# Patient Record
Sex: FEMALE | Race: WHITE | Employment: FULL TIME | URBAN - METROPOLITAN AREA
[De-identification: names, ages, dates, MRNs, and addresses within clinical notes are randomized per-mention and may not be internally consistent; named-entity substitution may affect disease eponyms.]

---

## 2017-09-05 ENCOUNTER — APPOINTMENT (OUTPATIENT)
Dept: MRI IMAGING | Age: 62
DRG: 617 | End: 2017-09-05
Attending: NURSE PRACTITIONER
Payer: COMMERCIAL

## 2017-09-05 ENCOUNTER — APPOINTMENT (OUTPATIENT)
Dept: GENERAL RADIOLOGY | Age: 62
DRG: 617 | End: 2017-09-05
Attending: NURSE PRACTITIONER
Payer: COMMERCIAL

## 2017-09-05 ENCOUNTER — HOSPITAL ENCOUNTER (INPATIENT)
Age: 62
LOS: 7 days | Discharge: HOME | DRG: 617 | End: 2017-09-12
Attending: ORTHOPAEDIC SURGERY | Admitting: ORTHOPAEDIC SURGERY
Payer: COMMERCIAL

## 2017-09-05 ENCOUNTER — ANESTHESIA EVENT (OUTPATIENT)
Dept: SURGERY | Age: 62
DRG: 617 | End: 2017-09-05
Payer: COMMERCIAL

## 2017-09-05 PROBLEM — L02.612 FOOT ABSCESS, LEFT: Status: ACTIVE | Noted: 2017-09-05

## 2017-09-05 LAB
ALBUMIN SERPL-MCNC: 3.6 G/DL (ref 3.5–5)
ALBUMIN/GLOB SERPL: 0.9 {RATIO} (ref 1.1–2.2)
ALP SERPL-CCNC: 69 U/L (ref 45–117)
ALT SERPL-CCNC: 25 U/L (ref 12–78)
ANION GAP SERPL CALC-SCNC: 8 MMOL/L (ref 5–15)
APPEARANCE UR: CLEAR
AST SERPL-CCNC: 18 U/L (ref 15–37)
ATRIAL RATE: 92 BPM
BACTERIA URNS QL MICRO: NEGATIVE /HPF
BASOPHILS # BLD: 0 K/UL (ref 0–0.1)
BASOPHILS NFR BLD: 1 % (ref 0–1)
BILIRUB SERPL-MCNC: 0.7 MG/DL (ref 0.2–1)
BILIRUB UR QL: NEGATIVE
BUN SERPL-MCNC: 13 MG/DL (ref 6–20)
BUN/CREAT SERPL: 21 (ref 12–20)
CALCIUM SERPL-MCNC: 8.1 MG/DL (ref 8.5–10.1)
CALCULATED P AXIS, ECG09: 60 DEGREES
CALCULATED R AXIS, ECG10: 13 DEGREES
CALCULATED T AXIS, ECG11: 36 DEGREES
CHLORIDE SERPL-SCNC: 105 MMOL/L (ref 97–108)
CO2 SERPL-SCNC: 26 MMOL/L (ref 21–32)
COLOR UR: NORMAL
CREAT SERPL-MCNC: 0.62 MG/DL (ref 0.55–1.02)
DIAGNOSIS, 93000: NORMAL
EOSINOPHIL # BLD: 0.1 K/UL (ref 0–0.4)
EOSINOPHIL NFR BLD: 1 % (ref 0–7)
EPITH CASTS URNS QL MICRO: NORMAL /LPF
ERYTHROCYTE [DISTWIDTH] IN BLOOD BY AUTOMATED COUNT: 13.4 % (ref 11.5–14.5)
EST. AVERAGE GLUCOSE BLD GHB EST-MCNC: 131 MG/DL
GLOBULIN SER CALC-MCNC: 4.2 G/DL (ref 2–4)
GLUCOSE BLD STRIP.AUTO-MCNC: 129 MG/DL (ref 65–100)
GLUCOSE BLD STRIP.AUTO-MCNC: 146 MG/DL (ref 65–100)
GLUCOSE SERPL-MCNC: 79 MG/DL (ref 65–100)
GLUCOSE UR STRIP.AUTO-MCNC: NEGATIVE MG/DL
HBA1C MFR BLD: 6.2 % (ref 4.2–6.3)
HCT VFR BLD AUTO: 40.6 % (ref 35–47)
HGB BLD-MCNC: 13.4 G/DL (ref 11.5–16)
HGB UR QL STRIP: NEGATIVE
INR PPP: 1.1 (ref 0.9–1.1)
KETONES UR QL STRIP.AUTO: NEGATIVE MG/DL
LEUKOCYTE ESTERASE UR QL STRIP.AUTO: NEGATIVE
LYMPHOCYTES # BLD: 1.3 K/UL (ref 0.8–3.5)
LYMPHOCYTES NFR BLD: 24 % (ref 12–49)
MCH RBC QN AUTO: 29.1 PG (ref 26–34)
MCHC RBC AUTO-ENTMCNC: 33 G/DL (ref 30–36.5)
MCV RBC AUTO: 88.3 FL (ref 80–99)
MONOCYTES # BLD: 0.5 K/UL (ref 0–1)
MONOCYTES NFR BLD: 10 % (ref 5–13)
NEUTS SEG # BLD: 3.6 K/UL (ref 1.8–8)
NEUTS SEG NFR BLD: 64 % (ref 32–75)
NITRITE UR QL STRIP.AUTO: NEGATIVE
P-R INTERVAL, ECG05: 134 MS
PH UR STRIP: 6.5 [PH] (ref 5–8)
PLATELET # BLD AUTO: 255 K/UL (ref 150–400)
POTASSIUM SERPL-SCNC: 3.6 MMOL/L (ref 3.5–5.1)
PROT SERPL-MCNC: 7.8 G/DL (ref 6.4–8.2)
PROT UR STRIP-MCNC: NEGATIVE MG/DL
PROTHROMBIN TIME: 11.1 SEC (ref 9–11.1)
Q-T INTERVAL, ECG07: 364 MS
QRS DURATION, ECG06: 66 MS
QTC CALCULATION (BEZET), ECG08: 450 MS
RBC # BLD AUTO: 4.6 M/UL (ref 3.8–5.2)
RBC #/AREA URNS HPF: NORMAL /HPF (ref 0–5)
SERVICE CMNT-IMP: ABNORMAL
SERVICE CMNT-IMP: ABNORMAL
SODIUM SERPL-SCNC: 139 MMOL/L (ref 136–145)
SP GR UR REFRACTOMETRY: 1 (ref 1–1.03)
UA: UC IF INDICATED,UAUC: NORMAL
UROBILINOGEN UR QL STRIP.AUTO: 0.2 EU/DL (ref 0.2–1)
VENTRICULAR RATE, ECG03: 92 BPM
WBC # BLD AUTO: 5.5 K/UL (ref 3.6–11)
WBC URNS QL MICRO: NORMAL /HPF (ref 0–4)

## 2017-09-05 PROCEDURE — 77030032490 HC SLV COMPR SCD KNE COVD -B

## 2017-09-05 PROCEDURE — 85025 COMPLETE CBC W/AUTO DIFF WBC: CPT | Performed by: NURSE PRACTITIONER

## 2017-09-05 PROCEDURE — 65270000029 HC RM PRIVATE

## 2017-09-05 PROCEDURE — 81001 URINALYSIS AUTO W/SCOPE: CPT | Performed by: NURSE PRACTITIONER

## 2017-09-05 PROCEDURE — 80053 COMPREHEN METABOLIC PANEL: CPT | Performed by: NURSE PRACTITIONER

## 2017-09-05 PROCEDURE — 73720 MRI LWR EXTREMITY W/O&W/DYE: CPT

## 2017-09-05 PROCEDURE — 36415 COLL VENOUS BLD VENIPUNCTURE: CPT | Performed by: NURSE PRACTITIONER

## 2017-09-05 PROCEDURE — A9577 INJ MULTIHANCE: HCPCS | Performed by: ORTHOPAEDIC SURGERY

## 2017-09-05 PROCEDURE — 74011250637 HC RX REV CODE- 250/637: Performed by: NURSE PRACTITIONER

## 2017-09-05 PROCEDURE — 74011250636 HC RX REV CODE- 250/636: Performed by: NURSE PRACTITIONER

## 2017-09-05 PROCEDURE — 83036 HEMOGLOBIN GLYCOSYLATED A1C: CPT | Performed by: NURSE PRACTITIONER

## 2017-09-05 PROCEDURE — 86900 BLOOD TYPING SEROLOGIC ABO: CPT | Performed by: NURSE PRACTITIONER

## 2017-09-05 PROCEDURE — 85610 PROTHROMBIN TIME: CPT | Performed by: NURSE PRACTITIONER

## 2017-09-05 PROCEDURE — 93005 ELECTROCARDIOGRAM TRACING: CPT

## 2017-09-05 PROCEDURE — 82962 GLUCOSE BLOOD TEST: CPT

## 2017-09-05 PROCEDURE — 74011250636 HC RX REV CODE- 250/636: Performed by: ORTHOPAEDIC SURGERY

## 2017-09-05 RX ORDER — VANCOMYCIN 1.75 GRAM/500 ML IN 0.9 % SODIUM CHLORIDE INTRAVENOUS
1750 ONCE
Status: COMPLETED | OUTPATIENT
Start: 2017-09-05 | End: 2017-09-05

## 2017-09-05 RX ORDER — GABAPENTIN 100 MG/1
CAPSULE ORAL 3 TIMES DAILY
COMMUNITY

## 2017-09-05 RX ORDER — INSULIN LISPRO 100 [IU]/ML
INJECTION, SOLUTION INTRAVENOUS; SUBCUTANEOUS
Status: DISCONTINUED | OUTPATIENT
Start: 2017-09-05 | End: 2017-09-12 | Stop reason: HOSPADM

## 2017-09-05 RX ORDER — SODIUM CHLORIDE 9 MG/ML
75 INJECTION, SOLUTION INTRAVENOUS CONTINUOUS
Status: DISCONTINUED | OUTPATIENT
Start: 2017-09-05 | End: 2017-09-12 | Stop reason: HOSPADM

## 2017-09-05 RX ORDER — SODIUM CHLORIDE 0.9 % (FLUSH) 0.9 %
5-10 SYRINGE (ML) INJECTION EVERY 8 HOURS
Status: DISCONTINUED | OUTPATIENT
Start: 2017-09-05 | End: 2017-09-12 | Stop reason: HOSPADM

## 2017-09-05 RX ORDER — ACETAMINOPHEN 325 MG/1
650 TABLET ORAL EVERY 6 HOURS
Status: DISCONTINUED | OUTPATIENT
Start: 2017-09-05 | End: 2017-09-12 | Stop reason: HOSPADM

## 2017-09-05 RX ORDER — CYCLOBENZAPRINE HCL 5 MG
7.5 TABLET ORAL
COMMUNITY

## 2017-09-05 RX ORDER — CYCLOBENZAPRINE HCL 10 MG
7.5 TABLET ORAL
Status: DISCONTINUED | OUTPATIENT
Start: 2017-09-05 | End: 2017-09-12 | Stop reason: HOSPADM

## 2017-09-05 RX ORDER — ONDANSETRON 2 MG/ML
4 INJECTION INTRAMUSCULAR; INTRAVENOUS
Status: DISCONTINUED | OUTPATIENT
Start: 2017-09-05 | End: 2017-09-12 | Stop reason: HOSPADM

## 2017-09-05 RX ORDER — PRAVASTATIN SODIUM 10 MG/1
20 TABLET ORAL
Status: DISCONTINUED | OUTPATIENT
Start: 2017-09-05 | End: 2017-09-12 | Stop reason: HOSPADM

## 2017-09-05 RX ORDER — AMOXICILLIN 250 MG
2 CAPSULE ORAL 2 TIMES DAILY
Status: DISCONTINUED | OUTPATIENT
Start: 2017-09-05 | End: 2017-09-12 | Stop reason: HOSPADM

## 2017-09-05 RX ORDER — OXYCODONE HYDROCHLORIDE 5 MG/1
5 TABLET ORAL
Status: DISCONTINUED | OUTPATIENT
Start: 2017-09-05 | End: 2017-09-12 | Stop reason: HOSPADM

## 2017-09-05 RX ORDER — NALOXONE HYDROCHLORIDE 0.4 MG/ML
0.4 INJECTION, SOLUTION INTRAMUSCULAR; INTRAVENOUS; SUBCUTANEOUS AS NEEDED
Status: DISCONTINUED | OUTPATIENT
Start: 2017-09-05 | End: 2017-09-12 | Stop reason: HOSPADM

## 2017-09-05 RX ORDER — SODIUM CHLORIDE 0.9 % (FLUSH) 0.9 %
5-10 SYRINGE (ML) INJECTION EVERY 8 HOURS
Status: DISCONTINUED | OUTPATIENT
Start: 2017-09-05 | End: 2017-09-08 | Stop reason: SDUPTHER

## 2017-09-05 RX ORDER — SODIUM CHLORIDE 0.9 % (FLUSH) 0.9 %
5-10 SYRINGE (ML) INJECTION AS NEEDED
Status: DISCONTINUED | OUTPATIENT
Start: 2017-09-05 | End: 2017-09-12 | Stop reason: HOSPADM

## 2017-09-05 RX ORDER — MAGNESIUM SULFATE 100 %
4 CRYSTALS MISCELLANEOUS AS NEEDED
Status: DISCONTINUED | OUTPATIENT
Start: 2017-09-05 | End: 2017-09-12 | Stop reason: HOSPADM

## 2017-09-05 RX ORDER — OXYCODONE HYDROCHLORIDE 5 MG/1
10 TABLET ORAL
Status: DISCONTINUED | OUTPATIENT
Start: 2017-09-05 | End: 2017-09-12 | Stop reason: HOSPADM

## 2017-09-05 RX ORDER — SODIUM CHLORIDE 0.9 % (FLUSH) 0.9 %
5-10 SYRINGE (ML) INJECTION AS NEEDED
Status: DISCONTINUED | OUTPATIENT
Start: 2017-09-05 | End: 2017-09-08 | Stop reason: SDUPTHER

## 2017-09-05 RX ORDER — GABAPENTIN 100 MG/1
100 CAPSULE ORAL 3 TIMES DAILY
Status: DISCONTINUED | OUTPATIENT
Start: 2017-09-05 | End: 2017-09-12 | Stop reason: HOSPADM

## 2017-09-05 RX ADMIN — OXYCODONE HYDROCHLORIDE 5 MG: 5 TABLET ORAL at 12:43

## 2017-09-05 RX ADMIN — SODIUM CHLORIDE 75 ML/HR: 900 INJECTION, SOLUTION INTRAVENOUS at 12:34

## 2017-09-05 RX ADMIN — SENNOSIDES AND DOCUSATE SODIUM 2 TABLET: 8.6; 5 TABLET ORAL at 13:42

## 2017-09-05 RX ADMIN — VANCOMYCIN HYDROCHLORIDE 1750 MG: 10 INJECTION, POWDER, LYOPHILIZED, FOR SOLUTION INTRAVENOUS at 16:03

## 2017-09-05 RX ADMIN — Medication 10 ML: at 22:30

## 2017-09-05 RX ADMIN — GADOBENATE DIMEGLUMINE 13 ML: 529 INJECTION, SOLUTION INTRAVENOUS at 14:35

## 2017-09-05 RX ADMIN — Medication 10 ML: at 13:43

## 2017-09-05 RX ADMIN — PRAVASTATIN SODIUM 20 MG: 10 TABLET ORAL at 22:29

## 2017-09-05 RX ADMIN — OXYCODONE HYDROCHLORIDE 10 MG: 5 TABLET ORAL at 18:06

## 2017-09-05 RX ADMIN — GABAPENTIN 100 MG: 100 CAPSULE ORAL at 22:29

## 2017-09-05 RX ADMIN — ACETAMINOPHEN 650 MG: 325 TABLET ORAL at 18:06

## 2017-09-05 RX ADMIN — OXYCODONE HYDROCHLORIDE 5 MG: 5 TABLET ORAL at 22:29

## 2017-09-05 RX ADMIN — ACETAMINOPHEN 650 MG: 325 TABLET ORAL at 13:42

## 2017-09-05 RX ADMIN — SODIUM CHLORIDE 75 ML/HR: 900 INJECTION, SOLUTION INTRAVENOUS at 16:00

## 2017-09-05 RX ADMIN — GABAPENTIN 100 MG: 100 CAPSULE ORAL at 18:07

## 2017-09-05 NOTE — PROGRESS NOTES
Pharmacy Automatic Renal Dosing Protocol - Antimicrobials    Indication:  SSTI, Left foot wound    Current Regimens:      Vancomycin consult -   Abx Allergies:  PCN, Sulfa    Micro: none presently noted (none noted PTA)    Ht Readings from Last 1 Encounters:   17 154.9 cm (61\")   ,  Wt Readings from Last 1 Encounters:   17 65.8 kg (145 lb 1 oz)        Estimated Creatinine Clearance: 80.6 mL/min (based on Cr of 0.62). Estimated Creatinine Clearance (using IBW): 70.1 mL/min    Recent Labs      17   1225   CREA  0.62   BUN  13   WBC  5.5   K  3.6   CA  8.1*   ALB  3.6   HGB  13.4   HCT  40.6   PLT  255   INR  1.1   ALT  25     Temp (24hrs), Av °F (36.7 °C), Min:98 °F (36.7 °C), Max:98 °F (36.7 °C)    CAPD, PD, HD or CVVH:  none  Paralysis, amputations:  none  Vancomycin Trough Goal Level:  10-15    Impression/Plan:  Vancomycin 1750mg IV loading dose then 1000mg IV q12h for a projected trough at Css of 14.7. No osteo identified in diagnostics or mentioned thusfar. Ordered BMP and CBC daily x 3 days. Will need trough level ordered before 3rd maintenance dose. Pharmacy will follow daily and adjust doses of monitored medications as appropriate.   Thank you,  Jovana Davis, [facility]

## 2017-09-05 NOTE — PERIOP NOTES
TRANSFER - IN REPORT:    Verbal report received from Leni Knapp on Bartolo Baird  being received from ortho (unit) for ordered procedure      Report consisted of patients Situation, Background, Assessment and   Recommendations(SBAR). Information from the following report(s) SBAR and Recent Results was reviewed with the receiving nurse. Opportunity for questions and clarification was provided. Assessment completed upon patients arrival to unit and care assumed. Surgery scheduled for 9/6/17; pt is new to [place]. Will call tomorrow am for an updated report.

## 2017-09-05 NOTE — PROGRESS NOTES
Primary Nurse Maximo Marcos and Jie Gill, RUDY performed a dual skin assessment on this patient No impairment noted  Javon score is 21    Wound on left foot, wrapped, clean and intact

## 2017-09-05 NOTE — H&P
Subjective:      Chief Complaint: Pain of the Left Foot        HPI:  Erik Munroe is a 60 y.o. female   With history of left foot pain for approximately 1 week. She has had a long-standing history of bilateral foot deformity. This included bilateral bunions and hammertoes. She has had it looked at before by an unknown provider and a recommended no surgical treatment as the deformity was not causing her any pain. She now reported that she had a sudden onset of pain 1 week ago. There was no injury associated with this. The pain is now constant. She describes it as a burning pain is located in the forefoot diffusely. She also has sharp pain present. The pain is 7/10 currently. It was 10/10 at its worst.  She has denied fevers. She believes that she did have some chills yesterday, these have since resolved. Putting pressure on her foot makes it worse. Icing the foot makes it better. She is a nonsmoker. She cannot report any recent cuts or injuries to the foot. She denies being diabetic.           Objective   Objective:   Constitutional:  No acute distress. Well nourished. Well developed. HEENT: atraumatic, normocephalic  Respiratory:  No labored breathing  Cardiovascular:  NRRR  Psychiatric: Alert and oriented x3.       Left LE:  Alignment:  Severe hallux valgus deformity with severe rigid hammertoe deformity. The 2nd MTP joint is dislocated and non reducible. There is a dorsal callus at the 2nd PIP joint. There is a significant plantar callus underneath the 2nd metatarsal head. Significant fullness of soft tissue at the plantar forefoot. The swelling appears to track distally into the 2nd toe.     SILT DP/SP/MP/LP/Sural/Saphenous  +EHL/FHL/TA/GSC/PL/PB  Palpable DP  CR brisk         Medical History         Past Medical History:   Diagnosis Date    Osteoporosis               Surgical History    History reviewed.  No pertinent surgical history.             Allergies   Allergen Reactions    Penicillins      Sulfa Antibiotics Rash          Social History    Social History            Social History    Marital status:        Spouse name: N/A    Number of children: N/A    Years of education: N/A          Occupational History    Not on file.           Social History Main Topics    Smoking status: Former Smoker    Smokeless tobacco: Never Used    Alcohol use Yes    Drug use: No    Sexual activity: Not on file           Other Topics Concern    Not on file      Social History Narrative               Radiographs:        X-ray Foot Left 3+ Views (20381)     Result Date: 9/5/2017  Standing. AP, Oblique, Lat. Notes  Indication(s):   Left foot Impression:  three views of the left foot with weight-bearing demonstrates severe hallux valgus deformity and hammertoe deformity of the  Lesser toes. There is complete dislocation of the 2nd MTP joint. No fractures are noted.            Assessment   Assessment:      1. Left foot pain    2. Abscess of foot                Plan   Plan:   In the office, I pared down the 2nd metatarsal head callus on the plantar surface of the foot. After unroofing this, abundant purulent material came from the underlying ulceration. This was cultured and is being sent to the lab. I am admitting the patient directly to the hospital.  She will undergo an MRI to evaluate for extent of the abscess. She is not currently septic appearing she will undergo IV antibiotics in the meantime. I will then perform a incision and debridement of the foot abscess. She also likely require a 2nd toe amputation given the involvement clinically. Because the 2nd toe is dislocated and driving the 2nd metatarsal  Head down  Into the ground, it is unlikely that the patient's symptoms and ulcerative lesion underneath the 2nd metatarsal head would improve with out amputating the 2nd toe. Functionally, this toe is not helping the patient whatsoever.   Additionally, acute correction of the deformity is not possible giving her severity, chronicity of deformity and the underlying infection. In the future, she may require a 2nd metatarsal shortening osteotomy or metatarsal head resection if her plantar callus does not resolve despite amputation of the 2nd toe. She may also require fusion of the great toe MTP joint once she has cleared her infection completely.   I will also order hemoglobin A1c while she is in the hospital.

## 2017-09-05 NOTE — PROGRESS NOTES
Problem: Falls - Risk of  Goal: *Absence of Falls  Document Dwight Fall Risk and appropriate interventions in the flowsheet.    Outcome: Progressing Towards Goal  Fall Risk Interventions:              Medication Interventions: Patient to call before getting OOB, Teach patient to arise slowly

## 2017-09-05 NOTE — IP AVS SNAPSHOT
[address] [facility] 
[phone] Patient: Aman Dee MRN: [id number] You are allergic to the following Allergen Reactions Penicillin G Rash Has been able to take amoxicillin in past   
    
 Sulfa (Sulfonamide Antibiotics) Rash Recent Documentation Height Weight BMI OB Status Smoking Status 1.549 m 65.8 kg 27.41 kg/m2 Hysterectomy Former Smoker Unresulted Labs Order Current Status CULTURE, BLOOD Preliminary result CULTURE, BLOOD Preliminary result Emergency Contacts Name Discharge Info Relation Home Work Mobile [facility] [facility] DISCHARGE CAREGIVER [3] Sister [23] [phone] About your hospitalization You were admitted on:  2017 You last received care in the:  [facility] You were discharged on:  2017 Unit phone number:  [phone] Why you were hospitalized Your primary diagnosis was: Foot Abscess, Left Providers Seen During Your Hospitalizations Provider Role Specialty Primary office phone Neftali Alejandra MD Attending Provider Orthopedic Surgery [phone] Your Primary Care Physician (PCP) Primary Care Physician Office Phone Office Fax Brooke Barillas 89 [phone] Follow-up Information Follow up With Details Comments Contact Info [facility] On 2017 This is your rolling walker provider. [address] [place] [address] 
[phone] Luis Antonio Simon MD   [address], [address] [place] [id number] [facility] 
[phone] Neftali Alejandra MD In 1 week  [facility] [address] [facility] 
[phone] Current Discharge Medication List  
  
START taking these medications Dose & Instructions Dispensing Information Comments Morning Noon Evening Bedtime  
 acetaminophen 325 mg tablet Commonly known as:  TYLENOL Your last dose was: Your next dose is:    
   
   
 Dose:  650 mg Take 2 Tabs by mouth every six (6) hours as needed for Pain. For mild to moderate pain Refills:  0  
     
   
   
   
  
 oxyCODONE IR 5 mg immediate release tablet Commonly known as:  Major Alexsandra Your last dose was: Your next dose is:    
   
   
 Dose:  5 mg Take 1 Tab by mouth every four (4) hours as needed (For pain 7-10). Max Daily Amount: 30 mg.  
 Quantity:  40 Tab Refills:  0  
     
   
   
   
  
 polyethylene glycol 17 gram packet Commonly known as:  Ada Royalty Your last dose was: Your next dose is:    
   
   
 Dose:  17 g Take 1 Packet by mouth daily as needed (constipation) for up to 15 days. Quantity:  15 Packet Refills:  0  
     
   
   
   
  
 senna-docusate 8.6-50 mg per tablet Commonly known as:  Alba Roller Your last dose was: Your next dose is:    
   
   
 Dose:  1 Tab Take 1 Tab by mouth daily. Quantity:  30 Tab Refills:  0 CONTINUE these medications which have NOT CHANGED Dose & Instructions Dispensing Information Comments Morning Noon Evening Bedtime  
 alendronate 70 mg tablet Commonly known as:  FOSAMAX Your last dose was: Your next dose is:    
   
   
 Dose:  70 mg Take 70 mg by mouth every Sunday. Refills:  0  
     
   
   
   
  
 b complex vitamins tablet Your last dose was: Your next dose is:    
   
   
 Dose:  1 Tab Take 1 Tab by mouth daily. Refills:  0 CRANBERRY CONCENTRATE Cap Generic drug:  vitamin c-vitamin e Your last dose was: Your next dose is: Take  by mouth. Refills:  0  
     
   
   
   
  
 cyclobenzaprine 5 mg tablet Commonly known as:  FLEXERIL Your last dose was: Your next dose is:    
   
   
 Dose:  7.5 mg Take 7.5 mg by mouth three (3) times daily as needed for Muscle Spasm(s). Refills:  0  
     
   
   
   
  
 gabapentin 100 mg capsule Commonly known as:  NEURONTIN Your last dose was: Your next dose is: Take  by mouth three (3) times daily. Refills:  0  
     
   
   
   
  
 lovastatin 20 mg tablet Commonly known as:  MEVACOR Your last dose was: Your next dose is: TAKE 1 TABLET NIGHTLY FOR CHOLESTEROL Quantity:  90 Tab Refills:  3 MULTI-VITAMIN PO Your last dose was: Your next dose is: Take  by mouth. Refills:  0  
     
   
   
   
  
 OTHER Your last dose was: Your next dose is:    
   
   
 Histanex:  May take 5 ml by mouth up to three times per day as needed for cough Quantity:  120 mL Refills:  0  
     
   
   
   
  
 VITAMIN B-12 1,000 mcg tablet Generic drug:  cyanocobalamin Your last dose was: Your next dose is:    
   
   
 Dose:  1000 mcg Take 1,000 mcg by mouth daily. Refills:  0  
     
   
   
   
  
 VITAMIN D3 1,000 unit tablet Generic drug:  cholecalciferol Your last dose was: Your next dose is: Take  by mouth daily. Refills:  0 STOP taking these medications   
 meloxicam 15 mg tablet Commonly known as:  MOBIC Where to Get Your Medications Information on where to get these meds will be given to you by the nurse or doctor. ! Ask your nurse or doctor about these medications  
  oxyCODONE IR 5 mg immediate release tablet  
 polyethylene glycol 17 gram packet  
 senna-docusate 8.6-50 mg per tablet Discharge Instructions Shmuel Minor MD 
Postoperative Instructions Right/Left Lower Extremity: 
  Heel touch weightbearing Dressing: Keep Dressing or Splint clean & dry Change dressing daily Showering: ? You may shower 48 hours after your surgery. Do Not allow dressing or splint to get wet! Diet:  
? Advance diet as tolerated. You may experience nausea due to anesthesia, pain or pain medications. You should avoid spicy or heavy meals initially. Pain Management: 
? If you have received a block, the pain relief can last from 4-24 hours. This also means you may not have sensation or movement in your foot for the same amount of time. ? You will have pain after the block wears off. Anticipate this and start pain medications prior to the block wearing off. 
? Do Not drive while taking narcotic pain medications. Elevation: 
? Strict elevation at or just above the level of your heart for the first 14 days after surgery. Limit time that foot is in dependent position for trips to bathroom and kitchen as much as possible. Early control of swelling will improve future swelling. Ice:  
? You may ice your surgical extremity for no longer than 20 minutes at a time, 3-4 times per day. Do Not apply ice directly to the skin. _ 
 
? Please DO NOT take any NSAIDs (Ibuprofen, Advil, Motrin, or Aleve) Call our office at [phone] if the following occur: ? Severe swelling, profuse bleeding or severe pain which does not improve with pain medication. ? Fever greater than 101.0; fevers less than this are very common in the first few days after surgery and are unlikely to indicate infection. Follow up: next week with Dr. Debby Lombardo. Call [phone] to schedule an appointment Discharge Orders None [facility] Announcement We are excited to announce that we are making your provider's discharge notes available to you in [employer]. You will see these notes when they are completed and signed by the physician that discharged you from your recent hospital stay.   If you have any questions or concerns about any information you see in [employer], please call the Health Information Department where you were seen or reach out to your Primary Care Provider for more information about your plan of care. Introducing [facility]! [facility] introduces [employer]pass patient portal. Now you can access parts of your medical record, email your doctor's office, and request medication refills online. 1. In your internet browser, go to [email or web address] 2. Click on the First Time User? Click Here link in the Sign In box. You will see the New Member Sign Up page. 3. Enter your Privepass Access Code exactly as it appears below. You will not need to use this code after youve completed the sign-up process. If you do not sign up before the expiration date, you must request a new code. · Privepass Access Code: [email or web address] Expires: 12/4/2017  9:38 AM 
 
4. Enter the last four digits of your Social Security Number (xxxx) and Date of Birth (mm/dd/yyyy) as indicated and click Submit. You will be taken to the next sign-up page. 5. Create a Privepass ID. This will be your Privepass login ID and cannot be changed, so think of one that is secure and easy to remember. 6. Create a Privepass password. You can change your password at any time. 7. Enter your Password Reset Question and Answer. This can be used at a later time if you forget your password. 8. Enter your e-mail address. You will receive e-mail notification when new information is available in [address] 9. Click Sign Up. You can now view and download portions of your medical record. 10. Click the Download Summary menu link to download a portable copy of your medical information. If you have questions, please visit the Frequently Asked Questions section of the [employer]pass website. Remember, [employer]pass is NOT to be used for urgent needs. For medical emergencies, dial 911. Now available from your iPhone and Android! General Information Please provide this summary of care documentation to your next provider. Patient Signature:  ____________________________________________________________ Date:  ____________________________________________________________  
  
Dewane Salen Provider Signature:  ____________________________________________________________ Date:  ____________________________________________________________

## 2017-09-06 ENCOUNTER — APPOINTMENT (OUTPATIENT)
Dept: GENERAL RADIOLOGY | Age: 62
DRG: 617 | End: 2017-09-06
Attending: ORTHOPAEDIC SURGERY
Payer: COMMERCIAL

## 2017-09-06 ENCOUNTER — APPOINTMENT (OUTPATIENT)
Dept: GENERAL RADIOLOGY | Age: 62
DRG: 617 | End: 2017-09-06
Attending: NURSE PRACTITIONER
Payer: COMMERCIAL

## 2017-09-06 ENCOUNTER — ANESTHESIA (OUTPATIENT)
Dept: SURGERY | Age: 62
DRG: 617 | End: 2017-09-06
Payer: COMMERCIAL

## 2017-09-06 LAB
ABO + RH BLD: NORMAL
ANION GAP SERPL CALC-SCNC: 7 MMOL/L (ref 5–15)
BASOPHILS # BLD: 0 K/UL (ref 0–0.1)
BASOPHILS NFR BLD: 0 % (ref 0–1)
BLOOD GROUP ANTIBODIES SERPL: NORMAL
BUN SERPL-MCNC: 14 MG/DL (ref 6–20)
BUN/CREAT SERPL: 23 (ref 12–20)
CALCIUM SERPL-MCNC: 8 MG/DL (ref 8.5–10.1)
CHLORIDE SERPL-SCNC: 107 MMOL/L (ref 97–108)
CO2 SERPL-SCNC: 26 MMOL/L (ref 21–32)
CREAT SERPL-MCNC: 0.62 MG/DL (ref 0.55–1.02)
EOSINOPHIL # BLD: 0.2 K/UL (ref 0–0.4)
EOSINOPHIL NFR BLD: 4 % (ref 0–7)
ERYTHROCYTE [DISTWIDTH] IN BLOOD BY AUTOMATED COUNT: 13.5 % (ref 11.5–14.5)
GLUCOSE BLD STRIP.AUTO-MCNC: 106 MG/DL (ref 65–100)
GLUCOSE BLD STRIP.AUTO-MCNC: 117 MG/DL (ref 65–100)
GLUCOSE BLD STRIP.AUTO-MCNC: 82 MG/DL (ref 65–100)
GLUCOSE BLD STRIP.AUTO-MCNC: 89 MG/DL (ref 65–100)
GLUCOSE BLD STRIP.AUTO-MCNC: 95 MG/DL (ref 65–100)
GLUCOSE SERPL-MCNC: 98 MG/DL (ref 65–100)
HCT VFR BLD AUTO: 34.3 % (ref 35–47)
HGB BLD-MCNC: 11.2 G/DL (ref 11.5–16)
LYMPHOCYTES # BLD: 1.3 K/UL (ref 0.8–3.5)
LYMPHOCYTES NFR BLD: 27 % (ref 12–49)
MCH RBC QN AUTO: 29.2 PG (ref 26–34)
MCHC RBC AUTO-ENTMCNC: 32.7 G/DL (ref 30–36.5)
MCV RBC AUTO: 89.6 FL (ref 80–99)
MONOCYTES # BLD: 0.7 K/UL (ref 0–1)
MONOCYTES NFR BLD: 15 % (ref 5–13)
NEUTS SEG # BLD: 2.6 K/UL (ref 1.8–8)
NEUTS SEG NFR BLD: 54 % (ref 32–75)
PLATELET # BLD AUTO: 216 K/UL (ref 150–400)
POTASSIUM SERPL-SCNC: 3.9 MMOL/L (ref 3.5–5.1)
RBC # BLD AUTO: 3.83 M/UL (ref 3.8–5.2)
SERVICE CMNT-IMP: ABNORMAL
SERVICE CMNT-IMP: ABNORMAL
SERVICE CMNT-IMP: NORMAL
SODIUM SERPL-SCNC: 140 MMOL/L (ref 136–145)
SPECIMEN EXP DATE BLD: NORMAL
WBC # BLD AUTO: 4.8 K/UL (ref 3.6–11)

## 2017-09-06 PROCEDURE — 71010 XR CHEST SNGL V: CPT

## 2017-09-06 PROCEDURE — 76000 FLUOROSCOPY <1 HR PHYS/QHP: CPT

## 2017-09-06 PROCEDURE — 85025 COMPLETE CBC W/AUTO DIFF WBC: CPT | Performed by: NURSE PRACTITIONER

## 2017-09-06 PROCEDURE — 0Y6S0Z0 DETACHMENT AT LEFT 2ND TOE, COMPLETE, OPEN APPROACH: ICD-10-PCS | Performed by: ORTHOPAEDIC SURGERY

## 2017-09-06 PROCEDURE — 87186 SC STD MICRODIL/AGAR DIL: CPT | Performed by: ORTHOPAEDIC SURGERY

## 2017-09-06 PROCEDURE — 74011250636 HC RX REV CODE- 250/636: Performed by: NURSE PRACTITIONER

## 2017-09-06 PROCEDURE — 76210000040 HC AMBSU PH I REC FIRST 0.5 HR: Performed by: ORTHOPAEDIC SURGERY

## 2017-09-06 PROCEDURE — 77030003601 HC NDL NRV BLK BBMI -A: Performed by: ANESTHESIOLOGY

## 2017-09-06 PROCEDURE — 77030020143 HC AIRWY LARYN INTUB CGAS -A: Performed by: ANESTHESIOLOGY

## 2017-09-06 PROCEDURE — 74011250636 HC RX REV CODE- 250/636

## 2017-09-06 PROCEDURE — 87077 CULTURE AEROBIC IDENTIFY: CPT | Performed by: ORTHOPAEDIC SURGERY

## 2017-09-06 PROCEDURE — 74011250636 HC RX REV CODE- 250/636: Performed by: ANESTHESIOLOGY

## 2017-09-06 PROCEDURE — 0KBW0ZZ EXCISION OF LEFT FOOT MUSCLE, OPEN APPROACH: ICD-10-PCS | Performed by: ORTHOPAEDIC SURGERY

## 2017-09-06 PROCEDURE — 77030006788 HC BLD SAW OSC STRY -B: Performed by: ORTHOPAEDIC SURGERY

## 2017-09-06 PROCEDURE — 65270000029 HC RM PRIVATE

## 2017-09-06 PROCEDURE — 87205 SMEAR GRAM STAIN: CPT | Performed by: ORTHOPAEDIC SURGERY

## 2017-09-06 PROCEDURE — 74011250637 HC RX REV CODE- 250/637: Performed by: NURSE PRACTITIONER

## 2017-09-06 PROCEDURE — 77030002916 HC SUT ETHLN J&J -A: Performed by: ORTHOPAEDIC SURGERY

## 2017-09-06 PROCEDURE — 36415 COLL VENOUS BLD VENIPUNCTURE: CPT | Performed by: NURSE PRACTITIONER

## 2017-09-06 PROCEDURE — 73620 X-RAY EXAM OF FOOT: CPT

## 2017-09-06 PROCEDURE — 77030011640 HC PAD GRND REM COVD -A: Performed by: ORTHOPAEDIC SURGERY

## 2017-09-06 PROCEDURE — 76210000050 HC AMBSU PH II REC 0.5 TO 1 HR: Performed by: ORTHOPAEDIC SURGERY

## 2017-09-06 PROCEDURE — 0QBP0ZZ EXCISION OF LEFT METATARSAL, OPEN APPROACH: ICD-10-PCS | Performed by: ORTHOPAEDIC SURGERY

## 2017-09-06 PROCEDURE — 82962 GLUCOSE BLOOD TEST: CPT

## 2017-09-06 PROCEDURE — 74011000250 HC RX REV CODE- 250

## 2017-09-06 PROCEDURE — 74011250636 HC RX REV CODE- 250/636: Performed by: ORTHOPAEDIC SURGERY

## 2017-09-06 PROCEDURE — 87075 CULTR BACTERIA EXCEPT BLOOD: CPT | Performed by: ORTHOPAEDIC SURGERY

## 2017-09-06 PROCEDURE — 76060000062 HC AMB SURG ANES 1 TO 1.5 HR: Performed by: ORTHOPAEDIC SURGERY

## 2017-09-06 PROCEDURE — 64450 NJX AA&/STRD OTHER PN/BRANCH: CPT | Performed by: ANESTHESIOLOGY

## 2017-09-06 PROCEDURE — 80048 BASIC METABOLIC PNL TOTAL CA: CPT | Performed by: NURSE PRACTITIONER

## 2017-09-06 PROCEDURE — 76030000001 HC AMB SURG OR TIME 1 TO 1.5: Performed by: ORTHOPAEDIC SURGERY

## 2017-09-06 RX ORDER — MORPHINE SULFATE 10 MG/ML
2 INJECTION, SOLUTION INTRAMUSCULAR; INTRAVENOUS
Status: DISCONTINUED | OUTPATIENT
Start: 2017-09-06 | End: 2017-09-06 | Stop reason: HOSPADM

## 2017-09-06 RX ORDER — FENTANYL CITRATE 50 UG/ML
INJECTION, SOLUTION INTRAMUSCULAR; INTRAVENOUS
Status: DISPENSED
Start: 2017-09-06 | End: 2017-09-07

## 2017-09-06 RX ORDER — ROPIVACAINE HYDROCHLORIDE 5 MG/ML
INJECTION, SOLUTION EPIDURAL; INFILTRATION; PERINEURAL
Status: DISPENSED
Start: 2017-09-06 | End: 2017-09-07

## 2017-09-06 RX ORDER — DIPHENHYDRAMINE HYDROCHLORIDE 50 MG/ML
12.5 INJECTION, SOLUTION INTRAMUSCULAR; INTRAVENOUS AS NEEDED
Status: DISCONTINUED | OUTPATIENT
Start: 2017-09-06 | End: 2017-09-06 | Stop reason: HOSPADM

## 2017-09-06 RX ORDER — SODIUM CHLORIDE 0.9 % (FLUSH) 0.9 %
5-10 SYRINGE (ML) INJECTION AS NEEDED
Status: DISCONTINUED | OUTPATIENT
Start: 2017-09-06 | End: 2017-09-06 | Stop reason: HOSPADM

## 2017-09-06 RX ORDER — FENTANYL CITRATE 50 UG/ML
INJECTION, SOLUTION INTRAMUSCULAR; INTRAVENOUS AS NEEDED
Status: DISCONTINUED | OUTPATIENT
Start: 2017-09-06 | End: 2017-09-06 | Stop reason: HOSPADM

## 2017-09-06 RX ORDER — HYDROMORPHONE HYDROCHLORIDE 1 MG/ML
.2-.5 INJECTION, SOLUTION INTRAMUSCULAR; INTRAVENOUS; SUBCUTANEOUS ONCE
Status: DISCONTINUED | OUTPATIENT
Start: 2017-09-06 | End: 2017-09-06 | Stop reason: HOSPADM

## 2017-09-06 RX ORDER — SODIUM CHLORIDE, SODIUM LACTATE, POTASSIUM CHLORIDE, CALCIUM CHLORIDE 600; 310; 30; 20 MG/100ML; MG/100ML; MG/100ML; MG/100ML
25 INJECTION, SOLUTION INTRAVENOUS CONTINUOUS
Status: DISCONTINUED | OUTPATIENT
Start: 2017-09-06 | End: 2017-09-06 | Stop reason: HOSPADM

## 2017-09-06 RX ORDER — PHENYLEPHRINE HCL IN 0.9% NACL 0.4MG/10ML
SYRINGE (ML) INTRAVENOUS AS NEEDED
Status: DISCONTINUED | OUTPATIENT
Start: 2017-09-06 | End: 2017-09-06 | Stop reason: HOSPADM

## 2017-09-06 RX ORDER — FENTANYL CITRATE 50 UG/ML
25 INJECTION, SOLUTION INTRAMUSCULAR; INTRAVENOUS
Status: DISCONTINUED | OUTPATIENT
Start: 2017-09-06 | End: 2017-09-06 | Stop reason: HOSPADM

## 2017-09-06 RX ORDER — LIDOCAINE HYDROCHLORIDE 20 MG/ML
INJECTION, SOLUTION INFILTRATION; PERINEURAL
Status: DISPENSED
Start: 2017-09-06 | End: 2017-09-07

## 2017-09-06 RX ORDER — LIDOCAINE HYDROCHLORIDE 20 MG/ML
INJECTION, SOLUTION EPIDURAL; INFILTRATION; INTRACAUDAL; PERINEURAL AS NEEDED
Status: DISCONTINUED | OUTPATIENT
Start: 2017-09-06 | End: 2017-09-06 | Stop reason: HOSPADM

## 2017-09-06 RX ORDER — MIDAZOLAM HYDROCHLORIDE 1 MG/ML
INJECTION, SOLUTION INTRAMUSCULAR; INTRAVENOUS AS NEEDED
Status: DISCONTINUED | OUTPATIENT
Start: 2017-09-06 | End: 2017-09-06 | Stop reason: HOSPADM

## 2017-09-06 RX ORDER — ONDANSETRON 2 MG/ML
INJECTION INTRAMUSCULAR; INTRAVENOUS AS NEEDED
Status: DISCONTINUED | OUTPATIENT
Start: 2017-09-06 | End: 2017-09-06 | Stop reason: HOSPADM

## 2017-09-06 RX ORDER — SODIUM CHLORIDE 0.9 % (FLUSH) 0.9 %
5-10 SYRINGE (ML) INJECTION EVERY 8 HOURS
Status: DISCONTINUED | OUTPATIENT
Start: 2017-09-06 | End: 2017-09-06 | Stop reason: HOSPADM

## 2017-09-06 RX ORDER — ENOXAPARIN SODIUM 100 MG/ML
40 INJECTION SUBCUTANEOUS EVERY 24 HOURS
Status: DISCONTINUED | OUTPATIENT
Start: 2017-09-06 | End: 2017-09-12 | Stop reason: HOSPADM

## 2017-09-06 RX ORDER — MIDAZOLAM HYDROCHLORIDE 1 MG/ML
INJECTION, SOLUTION INTRAMUSCULAR; INTRAVENOUS
Status: DISPENSED
Start: 2017-09-06 | End: 2017-09-07

## 2017-09-06 RX ORDER — PROPOFOL 10 MG/ML
INJECTION, EMULSION INTRAVENOUS AS NEEDED
Status: DISCONTINUED | OUTPATIENT
Start: 2017-09-06 | End: 2017-09-06 | Stop reason: HOSPADM

## 2017-09-06 RX ORDER — OXYCODONE AND ACETAMINOPHEN 5; 325 MG/1; MG/1
1 TABLET ORAL ONCE
Status: DISCONTINUED | OUTPATIENT
Start: 2017-09-06 | End: 2017-09-06 | Stop reason: HOSPADM

## 2017-09-06 RX ORDER — LIDOCAINE HYDROCHLORIDE 10 MG/ML
0.1 INJECTION, SOLUTION EPIDURAL; INFILTRATION; INTRACAUDAL; PERINEURAL AS NEEDED
Status: DISCONTINUED | OUTPATIENT
Start: 2017-09-06 | End: 2017-09-06 | Stop reason: HOSPADM

## 2017-09-06 RX ADMIN — ENOXAPARIN SODIUM 40 MG: 40 INJECTION SUBCUTANEOUS at 20:30

## 2017-09-06 RX ADMIN — Medication 10 ML: at 23:25

## 2017-09-06 RX ADMIN — SENNOSIDES AND DOCUSATE SODIUM 2 TABLET: 8.6; 5 TABLET ORAL at 17:37

## 2017-09-06 RX ADMIN — Medication 80 MCG: at 15:01

## 2017-09-06 RX ADMIN — Medication 40 MCG: at 14:37

## 2017-09-06 RX ADMIN — MIDAZOLAM HYDROCHLORIDE 0.5 MG: 1 INJECTION, SOLUTION INTRAMUSCULAR; INTRAVENOUS at 14:11

## 2017-09-06 RX ADMIN — LIDOCAINE HYDROCHLORIDE 20 MG: 20 INJECTION, SOLUTION EPIDURAL; INFILTRATION; INTRACAUDAL; PERINEURAL at 14:12

## 2017-09-06 RX ADMIN — SODIUM CHLORIDE 75 ML/HR: 900 INJECTION, SOLUTION INTRAVENOUS at 16:27

## 2017-09-06 RX ADMIN — GABAPENTIN 100 MG: 100 CAPSULE ORAL at 16:40

## 2017-09-06 RX ADMIN — OXYCODONE HYDROCHLORIDE 10 MG: 5 TABLET ORAL at 23:24

## 2017-09-06 RX ADMIN — OXYCODONE HYDROCHLORIDE 10 MG: 5 TABLET ORAL at 20:30

## 2017-09-06 RX ADMIN — SODIUM CHLORIDE 75 ML/HR: 900 INJECTION, SOLUTION INTRAVENOUS at 09:55

## 2017-09-06 RX ADMIN — VANCOMYCIN HYDROCHLORIDE 1000 MG: 1 INJECTION, POWDER, LYOPHILIZED, FOR SOLUTION INTRAVENOUS at 16:38

## 2017-09-06 RX ADMIN — ONDANSETRON 4 MG: 2 INJECTION INTRAMUSCULAR; INTRAVENOUS at 14:53

## 2017-09-06 RX ADMIN — Medication 40 MCG: at 14:29

## 2017-09-06 RX ADMIN — Medication 10 ML: at 16:08

## 2017-09-06 RX ADMIN — ACETAMINOPHEN 650 MG: 325 TABLET ORAL at 08:09

## 2017-09-06 RX ADMIN — PRAVASTATIN SODIUM 20 MG: 10 TABLET ORAL at 23:24

## 2017-09-06 RX ADMIN — OXYCODONE HYDROCHLORIDE 10 MG: 5 TABLET ORAL at 08:09

## 2017-09-06 RX ADMIN — FENTANYL CITRATE 50 MCG: 50 INJECTION, SOLUTION INTRAMUSCULAR; INTRAVENOUS at 12:58

## 2017-09-06 RX ADMIN — GABAPENTIN 100 MG: 100 CAPSULE ORAL at 08:09

## 2017-09-06 RX ADMIN — PROPOFOL 150 MG: 10 INJECTION, EMULSION INTRAVENOUS at 14:12

## 2017-09-06 RX ADMIN — ACETAMINOPHEN 650 MG: 325 TABLET ORAL at 01:00

## 2017-09-06 RX ADMIN — ACETAMINOPHEN 650 MG: 325 TABLET ORAL at 20:30

## 2017-09-06 RX ADMIN — VANCOMYCIN HYDROCHLORIDE 1000 MG: 1 INJECTION, POWDER, LYOPHILIZED, FOR SOLUTION INTRAVENOUS at 04:05

## 2017-09-06 RX ADMIN — GABAPENTIN 100 MG: 100 CAPSULE ORAL at 23:24

## 2017-09-06 RX ADMIN — SODIUM CHLORIDE, SODIUM LACTATE, POTASSIUM CHLORIDE, AND CALCIUM CHLORIDE 25 ML/HR: 600; 310; 30; 20 INJECTION, SOLUTION INTRAVENOUS at 12:28

## 2017-09-06 RX ADMIN — Medication 40 MCG: at 14:23

## 2017-09-06 RX ADMIN — MIDAZOLAM HYDROCHLORIDE 2 MG: 1 INJECTION, SOLUTION INTRAMUSCULAR; INTRAVENOUS at 12:58

## 2017-09-06 NOTE — ANESTHESIA PREPROCEDURE EVALUATION
Anesthetic History     PONV (denies motion sickness)          Review of Systems / Medical History  Patient summary reviewed, nursing notes reviewed and pertinent labs reviewed    Pulmonary  Within defined limits                 Neuro/Psych   Within defined limits           Cardiovascular              Hyperlipidemia    Exercise tolerance: >4 METS     GI/Hepatic/Renal             Pertinent negatives: No GERD   Endo/Other    Diabetes (borderline)    Arthritis     Other Findings   Comments: Low back pain/ HNP L4-5  Sciatica right hip           Physical Exam    Airway  Mallampati: II  TM Distance: 4 - 6 cm  Neck ROM: normal range of motion   Mouth opening: Normal     Cardiovascular    Rhythm: regular  Rate: normal      Pertinent negatives: No murmur   Dental  No notable dental hx       Pulmonary  Breath sounds clear to auscultation               Abdominal  GI exam deferred       Other Findings            Anesthetic Plan    ASA: 2  Anesthesia type: regional and general - popliteal fossa block and saphenous block      Post-op pain plan if not by surgeon: peripheral nerve block single    Induction: Intravenous  Anesthetic plan and risks discussed with: Patient      preop glucose 95. PONV prophylaxis.

## 2017-09-06 NOTE — ROUTINE PROCESS
Bedside and Verbal shift change report given to [facility] (oncoming nurse) by Gaetano Herrera (offgoing nurse). Report included the following information SBAR, Intake/Output, MAR and Recent Results.

## 2017-09-06 NOTE — PERIOP NOTES
Cale Humphrey  [birth year]  [id number]    Situation:  Verbal report given from: LUIS Golden CRNA, RN  Procedure: Procedure(s):  INCISION AND DEBRIDEMENT OF LEFT FOOT ABSCESS, AMPUTATION OF LEFT SECOND TOE, EXCISION OF LEFT SECOND METATARSAL HEAD  (GENERAL WITH BLOCK)    Background:    Preoperative diagnosis: ABSCESS OF LEFT FOOT    Postoperative diagnosis: ABSCESS OF LEFT FOOT, HAMMERTOE, PLANTAR KERATOSIS          :  Dr. Gianna Lr    Assistant(s): Circ-1: Lj Aguilar RN  Scrub Tech-1: [place] Alcantar  Surg Asst-1: Catie Morin    Specimens:   ID Type Source Tests Collected by Time Destination   1 : Left second toe Preservative Toe  Neftali Lr MD 9/6/2017 1430 Pathology   1 : Left foot wound Wound Foot, left ANAEROBIC/AEROBIC/GRAM STAIN Neftali Lr MD 9/6/2017 1438 Microbiology       Assessment:  Intra-procedure medications         Anesthesia gave intra-procedure sedation and medications, see anesthesia flow sheet     Intravenous fluids: LR@ KVO     Vital signs stable       Recommendation:    Permission to share finding with family or friend yes

## 2017-09-06 NOTE — ANESTHESIA PROCEDURE NOTES
Peripheral Block    Start time: 9/6/2017 12:13 PM  End time: 9/6/2017 1:18 PM  Performed by: Medardo Melgar  Authorized by: Medardo Melgar       Pre-procedure:   Timeout Time: 12:57          Block Type:   Block Type:  Saphenous  Laterality:  Left  Monitoring:  Standard ASA monitoring, responsive to questions and oxygen  Injection Technique:  Single shot  Procedures: ultrasound guided    Patient Position: prone  Prep: chlorhexidine    Location:  Medial anterior leg above ankle (level of tibial tubercle)  Catheter size: 25 g.   Medication Injected:  0.5%  ropivacaine  Volume (mL):  10    Assessment:  Number of attempts:  1  Injection Assessment:  Incremental injection every 5 mL, no paresthesia, local visualized surrounding nerve on ultrasound, negative aspiration for blood and no intravascular symptoms  Patient tolerance:  Patient tolerated the procedure well with no immediate complications

## 2017-09-06 NOTE — INTERVAL H&P NOTE
H&P Update:  Jade Naidu was seen and examined. History and physical has been reviewed. The patient has been examined. There have been no significant clinical changes since the completion of the originally dated History and Physical.  Patient identified by surgeon; surgical site was confirmed by patient and surgeon. MRI reviewed showing sinus tract from plantar foot to 2nd metatarsal head. Changes consistent with osteomyelitis of 2nd metatarsal head as well. Will plan on amputating 2nd toe, debriding abscess and infected/necrotic material, and excising the 2nd metatarsal head for curative purpose and to relieve increased plantar pressure which caused the plantar keratosis. Consent obtained after risks/benefits discussed. Risks included but not limited to anesthetic complications, bleeding, wound healing delay, nerve injury/pain, worsening infection, need for more proximal amputation. She understands and wishes to proceed. Signed By: Josr Cunningham MD     September 6, 2017 1:55 PM

## 2017-09-06 NOTE — BRIEF OP NOTE
BRIEF OPERATIVE NOTE    Date of Procedure: 9/6/2017   Preoperative Diagnosis: ABSCESS OF LEFT FOOT  Postoperative Diagnosis: ABSCESS OF LEFT FOOT, HAMMERTOE, PLANTAR KERATOSIS          Procedure(s):  INCISION AND DEBRIDEMENT OF LEFT FOOT ABSCESS, AMPUTATION OF LEFT SECOND TOE, EXCISION OF LEFT SECOND METATARSAL HEAD  (GENERAL WITH BLOCK)  Surgeon(s) and Role:     * Neftali Chavez MD - Primary         Assistant Staff:       Surgical Staff:  Circ-1: Óscar Smith RN  Scrub Tech-1: Kalpana Carrillo  Surg Asst-1: Bert Quintana  Event Time In   Incision Start 1427   Incision Close 1502     Anesthesia: General   Estimated Blood Loss: < 10 cc  Specimens:   ID Type Source Tests Collected by Time Destination   1 : Left second toe Preservative Toe  Neftali Chavez MD 9/6/2017 1430 Pathology   1 : Left foot wound Wound Foot, left ANAEROBIC/AEROBIC/GRAM STAIN Neftali Chavez MD 9/6/2017 1438 Microbiology      Findings: see full note   Complications: none  Implants: * No implants in log *

## 2017-09-06 NOTE — ANESTHESIA PROCEDURE NOTES
Peripheral Block    Start time: 9/6/2017 12:55 PM  End time: 9/6/2017 1:15 PM  Performed by: Fely Corey  Authorized by: Fely Corey       Pre-procedure:    Indications: at surgeon's request and post-op pain management    Preanesthetic Checklist: patient identified, risks and benefits discussed, site marked, timeout performed, anesthesia consent given and patient being monitored    Timeout Time: 12:57          Block Type:   Block Type:  Popliteal  Laterality:  Left  Monitoring:  Standard ASA monitoring, responsive to questions and oxygen  Injection Technique:  Single shot  Procedures: ultrasound guided and nerve stimulator    Patient Position: prone  Prep: chlorhexidine    Location:  Mid thigh  Needle Type:  Stimuplex  Needle Gauge:  22 G  Needle Localization:  Ultrasound guidance and nerve stimulator  Motor Response: minimal motor response >0.4 mA    Medication Injected:  0.5%  ropivacaine  Volume (mL):  40    Assessment:  Number of attempts:  1  Injection Assessment:  Incremental injection every 5 mL, no paresthesia, ultrasound image on chart, local visualized surrounding nerve on ultrasound, negative aspiration for blood and no intravascular symptoms  Patient tolerance:  Patient tolerated the procedure well with no immediate complications

## 2017-09-06 NOTE — PERIOP NOTES
Dr. Anu Giordano performed popliteal fossa block and saphenous block in preop using ultrasound machine and nerve stimulator. Pt on CM x3, 02 by NC at 2L, patient responsive when spoken to. Able to answer questions appropriately. Pt did receive 2mg Versed and 50mcg fentanyl given by Dr. Anu Giordano for sedation. Pt tolerated procedure well.  VSS and will continue to monitor

## 2017-09-06 NOTE — PERIOP NOTES
TRANSFER - IN REPORT:    Verbal report received from Jamia Bassett RN on Mary Vazquez  being received from Room [id number] for ordered procedure      Report consisted of patients Situation, Background, Assessment and   Recommendations(SBAR). Information from the following report(s) SBAR was reviewed with the receiving nurse. Opportunity for questions and clarification was provided.

## 2017-09-06 NOTE — PERIOP NOTES
TRANSFER - OUT REPORT:    Verbal report given to Hermes Massey RN(name) on Kathy Holden  being transferred to Ortho(unit) for routine post - op       Report consisted of patients Situation, Background, Assessment and   Recommendations(SBAR). Information from the following report(s) SBAR, Kardex, OR Summary, Intake/Output and MAR was reviewed with the receiving nurse. Opportunity for questions and clarification was provided.       Patient transported with:   Registered Nurse

## 2017-09-06 NOTE — PERIOP NOTES
1521-Pt very sleepy, checked blood sugar by fingerstick, result is 82.    1528-pt tolerating liquids at this time. VSS. Pt has block in L foot/ leg. Will fit pt for a new post op shoe. 1600-Pt voided large amount of yellow urine in bedpan. Saline lock in LAC flushed and capped    1625-Pt transported to room 3217, assisted onto bed and L leg elevated and SCD machine connected. [facility] in room assuming care of the patient.

## 2017-09-06 NOTE — OP NOTES
DATE: 9/6/17    Preoperative Diagnosis:  Left Lower extremity:  1. Abscess forefoot  2. 2nd hammertoe with complete dislocation of MTP joint  3. Hypertrophic plantar keratosis    Postoperative Diagnosis:  Same    Procedure:  1. Amputation 2 toe, through MTP joint  2. Exostectomy of   3. Debridement of plantar foot abscess    Surgeon:   oJno Dowell MD    Anesthesia:  General  Ankle block  1% plain lidocaine local injection    EBL:  10 cc    Complications:  none    Specimen:  2nd toe    Implants:  none     HPI/Indication:  59 yo female with history of severe forefoot deformity causing an ulceration under her 2nd metatarsal head which became infected. An abscess of the forefoot with a sinus tract to the 2nd metatarsal head subsequently developed. Amputation recommended as digit is not salvageable and maintenance of current state may result in worsening infection, sepsis, worsening pain and deformity. Furthermore, the abscess and any infected/necrotic tissure requires resection. This includes exostectomy of the 2nd metatarsal for osteomyelitis. Risks/benefits discussed with patient, which include but are not limited to bleeding, nerve pain, further toe deformity, wound healing delay, worsening infection, need for further surgery. Procedure: The patient arrived at the surgical center. Consent was obtained for the surgical procedure, as well as the Anesthetic. The correct surgical limb was identified and marked by the patient and myself. The Anesthesiologist placed a nerve block. The patient was then brought to the Operating suite, where a time-out was performed which identified the patient's identity, the surgical site, and the surgical procedure. The patient was prepped and draped in standard, sterile fashion. Once completely draped, another time-out was performed identifying all pertinent information.     The leg was elevated for 2 minutes and a tourniquet was inflated at the level of the thigh at 250 mmHg.    The 2nd toe was identified. A raquette type incision was performed and soft tissue structures were sharply incised, exposing the 2nd metatarsal head. The toe was amputated through the MTP joint. The MT head was then identified. There was a significant deformity with a prominent plantar aspect to the metatarsal head. A TPS saw was then used to excise the distal portion of the metatarsal on an oblique angle to avoid increased plantar pressure. Attention was then turned to the plantar ulceration. Anaerobic and aerobic swabs were performed and sent for culture. Hypertrophic skin was debrided with sharp cutting and rongeuring. Debridement deeper was then carried out in and excisional fashion by sharp cutting for any necrotic, contaminated and infected appearing tissue. This included skin, subcutaneous tissue, musle, fat. 3 L of sterile saline was pulse lavaged through the wound. The tourniquet was removed and hemostasis was achieved. The ulcer was inspected and debrided until healthy appearing tissues with bleeding edges were obtained. In total, the area was debrided to 15mm by 10mm. The wounds were irrigated and closed in a layered fashion. A sterile dressing of xeroform, 4x4s and sterile webril and an ACE was applied.     Plan:  - Weightbearing: as tolerated through heel in Postop shoe  - Keep dressing clean and dry  - Elevate extremity just above level of heart  - Pain control  - ID consult  - follow cultures  - IV vancomycin for now  - Dispo: pending ID input and antibiotics; likely DC on oral antibiotics

## 2017-09-06 NOTE — ANESTHESIA POSTPROCEDURE EVALUATION
Post-Anesthesia Evaluation and Assessment    Patient: Cale Humphrey MRN: [id number]  SSN: [id number]    YOB: 1955  Age: 60 y.o. Sex: female       Cardiovascular Function/Vital Signs  Visit Vitals    /73    Pulse 98    Temp 36.9 °C (98.4 °F)    Resp 15    Ht 5' 1\" (1.549 m)    Wt 65.8 kg (145 lb 1 oz)    SpO2 95%    BMI 27.41 kg/m2       Patient is status post regional, general anesthesia for Procedure(s):  INCISION AND DEBRIDEMENT OF LEFT FOOT ABSCESS, AMPUTATION OF LEFT SECOND TOE, EXCISION OF LEFT SECOND METATARSAL HEAD  (GENERAL WITH BLOCK). Nausea/Vomiting: None    Postoperative hydration reviewed and adequate. Pain:  Pain Scale 1: Numeric (0 - 10) (09/06/17 1541)  Pain Intensity 1: 0 (09/06/17 1541)   Managed. Pt has Left popliteal-saphenous blocks. Neurological Status:   Neuro (WDL): Within Defined Limits (09/06/17 1526)  Neuro  Neurologic State: Alert (09/06/17 1526)   At baseline    Mental Status and Level of Consciousness: Arousable    Pulmonary Status:   O2 Device: Room air (09/06/17 1541)   Adequate oxygenation and airway patent    Complications related to anesthesia: None    Post-anesthesia assessment completed. No concerns. Stable for return to floor; inpatient.     Signed By: Noris Piña MD     September 6, 2017

## 2017-09-06 NOTE — ROUTINE PROCESS
Bedside and Verbal shift change report given to [facility] (oncoming nurse) by Ramon Stratton (offgoing nurse). Report included the following information SBAR, Kardex, Intake/Output, MAR and Recent Results.

## 2017-09-06 NOTE — PROGRESS NOTES
S: patient comfortable. Pain controlled. No cp, sob. No chills.     O:  Visit Vitals    /66 (BP 1 Location: Right arm, BP Patient Position: At rest)    Pulse 76    Temp 98 °F (36.7 °C)    Resp 11    Ht 5' 1\" (1.549 m)    Wt 65.8 kg (145 lb 1 oz)    SpO2 97%    BMI 27.41 kg/m2       Left Lower Extremity:   - Dressing with purulent drainage from plantar foot  - Motor: +EHL/FHL/TA/GSC/PL/PB  - SILT DP/MP/LP/MP/SURAL/SAPHENOUS  - Vascular: + DP, CR < 2 sec    Pertinent Labs:  Hgb A1C - 6.2      A/P: left foot abscess, plantar keratosis 2/2 hammertoe deformity and increased plantar pressure under the 2nd metatarsal head  - plan on OR today  - continue vancomycin pending cultures  - pain control

## 2017-09-07 LAB
ANION GAP SERPL CALC-SCNC: 5 MMOL/L (ref 5–15)
BASOPHILS # BLD: 0 K/UL (ref 0–0.1)
BASOPHILS NFR BLD: 0 % (ref 0–1)
BUN SERPL-MCNC: 12 MG/DL (ref 6–20)
BUN/CREAT SERPL: 15 (ref 12–20)
CALCIUM SERPL-MCNC: 8.3 MG/DL (ref 8.5–10.1)
CHLORIDE SERPL-SCNC: 102 MMOL/L (ref 97–108)
CO2 SERPL-SCNC: 30 MMOL/L (ref 21–32)
CREAT SERPL-MCNC: 0.79 MG/DL (ref 0.55–1.02)
DATE LAST DOSE: ABNORMAL
EOSINOPHIL # BLD: 0.1 K/UL (ref 0–0.4)
EOSINOPHIL NFR BLD: 2 % (ref 0–7)
ERYTHROCYTE [DISTWIDTH] IN BLOOD BY AUTOMATED COUNT: 13.3 % (ref 11.5–14.5)
GLUCOSE BLD STRIP.AUTO-MCNC: 110 MG/DL (ref 65–100)
GLUCOSE BLD STRIP.AUTO-MCNC: 118 MG/DL (ref 65–100)
GLUCOSE BLD STRIP.AUTO-MCNC: 125 MG/DL (ref 65–100)
GLUCOSE BLD STRIP.AUTO-MCNC: 153 MG/DL (ref 65–100)
GLUCOSE SERPL-MCNC: 92 MG/DL (ref 65–100)
HCT VFR BLD AUTO: 36.4 % (ref 35–47)
HGB BLD-MCNC: 12.2 G/DL (ref 11.5–16)
LYMPHOCYTES # BLD: 1.8 K/UL (ref 0.8–3.5)
LYMPHOCYTES NFR BLD: 28 % (ref 12–49)
MCH RBC QN AUTO: 30.4 PG (ref 26–34)
MCHC RBC AUTO-ENTMCNC: 33.5 G/DL (ref 30–36.5)
MCV RBC AUTO: 90.8 FL (ref 80–99)
MONOCYTES # BLD: 0.7 K/UL (ref 0–1)
MONOCYTES NFR BLD: 10 % (ref 5–13)
NEUTS SEG # BLD: 4 K/UL (ref 1.8–8)
NEUTS SEG NFR BLD: 60 % (ref 32–75)
PLATELET # BLD AUTO: 272 K/UL (ref 150–400)
POTASSIUM SERPL-SCNC: 4.1 MMOL/L (ref 3.5–5.1)
RBC # BLD AUTO: 4.01 M/UL (ref 3.8–5.2)
REPORTED DOSE,DOSE: ABNORMAL UNITS
REPORTED DOSE/TIME,TMG: 1600
SERVICE CMNT-IMP: ABNORMAL
SODIUM SERPL-SCNC: 137 MMOL/L (ref 136–145)
VANCOMYCIN TROUGH SERPL-MCNC: 14.6 UG/ML (ref 5–10)
WBC # BLD AUTO: 6.6 K/UL (ref 3.6–11)

## 2017-09-07 PROCEDURE — 80048 BASIC METABOLIC PNL TOTAL CA: CPT | Performed by: ORTHOPAEDIC SURGERY

## 2017-09-07 PROCEDURE — 80202 ASSAY OF VANCOMYCIN: CPT | Performed by: ORTHOPAEDIC SURGERY

## 2017-09-07 PROCEDURE — 74011250637 HC RX REV CODE- 250/637: Performed by: NURSE PRACTITIONER

## 2017-09-07 PROCEDURE — 65270000029 HC RM PRIVATE

## 2017-09-07 PROCEDURE — 88311 DECALCIFY TISSUE: CPT | Performed by: ORTHOPAEDIC SURGERY

## 2017-09-07 PROCEDURE — 85025 COMPLETE CBC W/AUTO DIFF WBC: CPT | Performed by: ORTHOPAEDIC SURGERY

## 2017-09-07 PROCEDURE — 97116 GAIT TRAINING THERAPY: CPT | Performed by: PHYSICAL THERAPIST

## 2017-09-07 PROCEDURE — G8980 MOBILITY D/C STATUS: HCPCS | Performed by: PHYSICAL THERAPIST

## 2017-09-07 PROCEDURE — 97161 PT EVAL LOW COMPLEX 20 MIN: CPT | Performed by: PHYSICAL THERAPIST

## 2017-09-07 PROCEDURE — 36415 COLL VENOUS BLD VENIPUNCTURE: CPT | Performed by: ORTHOPAEDIC SURGERY

## 2017-09-07 PROCEDURE — 88305 TISSUE EXAM BY PATHOLOGIST: CPT | Performed by: ORTHOPAEDIC SURGERY

## 2017-09-07 PROCEDURE — 74011250636 HC RX REV CODE- 250/636: Performed by: ORTHOPAEDIC SURGERY

## 2017-09-07 PROCEDURE — G8979 MOBILITY GOAL STATUS: HCPCS | Performed by: PHYSICAL THERAPIST

## 2017-09-07 PROCEDURE — 74011250636 HC RX REV CODE- 250/636: Performed by: NURSE PRACTITIONER

## 2017-09-07 PROCEDURE — 82962 GLUCOSE BLOOD TEST: CPT

## 2017-09-07 PROCEDURE — 87040 BLOOD CULTURE FOR BACTERIA: CPT | Performed by: NURSE PRACTITIONER

## 2017-09-07 PROCEDURE — G8978 MOBILITY CURRENT STATUS: HCPCS | Performed by: PHYSICAL THERAPIST

## 2017-09-07 PROCEDURE — 97530 THERAPEUTIC ACTIVITIES: CPT | Performed by: PHYSICAL THERAPIST

## 2017-09-07 RX ADMIN — OXYCODONE HYDROCHLORIDE 10 MG: 5 TABLET ORAL at 22:13

## 2017-09-07 RX ADMIN — OXYCODONE HYDROCHLORIDE 10 MG: 5 TABLET ORAL at 18:51

## 2017-09-07 RX ADMIN — ENOXAPARIN SODIUM 40 MG: 40 INJECTION SUBCUTANEOUS at 22:13

## 2017-09-07 RX ADMIN — ACETAMINOPHEN 650 MG: 325 TABLET ORAL at 13:42

## 2017-09-07 RX ADMIN — ACETAMINOPHEN 650 MG: 325 TABLET ORAL at 18:51

## 2017-09-07 RX ADMIN — GABAPENTIN 100 MG: 100 CAPSULE ORAL at 09:00

## 2017-09-07 RX ADMIN — GABAPENTIN 100 MG: 100 CAPSULE ORAL at 15:24

## 2017-09-07 RX ADMIN — VANCOMYCIN HYDROCHLORIDE 1000 MG: 1 INJECTION, POWDER, LYOPHILIZED, FOR SOLUTION INTRAVENOUS at 03:38

## 2017-09-07 RX ADMIN — OXYCODONE HYDROCHLORIDE 10 MG: 5 TABLET ORAL at 03:37

## 2017-09-07 RX ADMIN — VANCOMYCIN HYDROCHLORIDE 1000 MG: 1 INJECTION, POWDER, LYOPHILIZED, FOR SOLUTION INTRAVENOUS at 15:23

## 2017-09-07 RX ADMIN — Medication 10 ML: at 06:00

## 2017-09-07 RX ADMIN — Medication 10 ML: at 14:24

## 2017-09-07 RX ADMIN — ACETAMINOPHEN 650 MG: 325 TABLET ORAL at 06:39

## 2017-09-07 RX ADMIN — SENNOSIDES AND DOCUSATE SODIUM 2 TABLET: 8.6; 5 TABLET ORAL at 09:00

## 2017-09-07 RX ADMIN — OXYCODONE HYDROCHLORIDE 10 MG: 5 TABLET ORAL at 06:36

## 2017-09-07 RX ADMIN — Medication 10 ML: at 22:16

## 2017-09-07 RX ADMIN — Medication 10 ML: at 22:15

## 2017-09-07 RX ADMIN — ACETAMINOPHEN 650 MG: 325 TABLET ORAL at 03:37

## 2017-09-07 RX ADMIN — PRAVASTATIN SODIUM 20 MG: 10 TABLET ORAL at 22:13

## 2017-09-07 RX ADMIN — GABAPENTIN 100 MG: 100 CAPSULE ORAL at 22:13

## 2017-09-07 RX ADMIN — OXYCODONE HYDROCHLORIDE 10 MG: 5 TABLET ORAL at 13:42

## 2017-09-07 RX ADMIN — SENNOSIDES AND DOCUSATE SODIUM 2 TABLET: 8.6; 5 TABLET ORAL at 18:51

## 2017-09-07 NOTE — CONSULTS
Infectious Disease Consult Note    Reason for Consult: osteomyelitis   Date of Consultation: 2017  Date of Admission: 2017  Referring Physician: Dr Debby Lombardo       HPI:  Robin Abraham is a 60y.o. year old female with history of ? DM as patient says it is borderline and A1C was 5.6 in  , meka/melvina  who was in her general state of health when she noticed pain in her left foot about a week or so ago. Denied any trauma to feet. She also reported that she had chills but denied fevers. Denied any open wounds on her foot or discharge. Ortho notes indicate that she had \"  pared down the 2nd metatarsal head callus on the plantar surface of the foot.  After unroofing this, abundant purulent material came from the underlying ulceration.  This was cultured . \" She was admitted to the hospital on  and underwent amputation 2 toe, through MTP joint, and debridement of plantar foot abscess. Cultures are pending at this time but gram stain reported as g+C pairs and light probable S aureus, coag negative. MRI Left foot showed  \"fluid filled tract extending from the volar forefoot to the head of the second metatarsal likely related to infection\". \"Abnormal signal in the distal half of the second metatarsal likely related to osteomyelitis. : \"Mild second MTP joint effusion which could be reactive versus related to septic arthritis. . She reports that she has good foot wear usually, wears tennis shoes. Denied any other issues at this time. ID consulted for recommendations today    Past Medical History:  Past Medical History:   Diagnosis Date    Arthritis     Diabetes ([address]     GERD (gastroesophageal reflux disease)     Hyperlipidemia     Nausea & vomiting     Osteopenia          Surgical History:  Past Surgical History:   Procedure Laterality Date    HX APPENDECTOMY      HX  SECTION      HX CHOLECYSTECTOMY           Family History:   History reviewed.  No pertinent family history. Social History:     · Living Situation: lives in [facility]   · Tobacco:denied  · Alcohol:denied  · Illicit Drugs:denied  · Sexual History: denied being active   · Travel History denied  · Exposures: Denied all of the below   · Outdoor/Hiking  · Animal/Pet close contact   · Construction:  · Hot Tub:  · Brackish/stagnant exposure  · TB exposure:   · Sick Contacts: Allergies: Allergies   Allergen Reactions    Penicillin G Rash    Sulfa (Sulfonamide Antibiotics) Rash         Review of Systems:     Gen: Negative for , weight loss, weight gain, + chills, denied fever    HEENT: Negative for headache, vision changes, ear ache or discharge, tingling,  nasal discharge, swelling, lumps in neck, sores on tongue   CV:  Negative for chest pain, dyspnea on exertion, leg edema   Lungs: Negative for shortness of breath, cough, wheezing   Abdomen: Negative for abdominal pain, nausea, vomiting, diarrhea, constipation   Genitourinary: Negative for genital pain or genital discharge     Neuro: Negative for headache, numbness, tingling, extremity weakness,  syncope, seizures    Skin: Negative for rash, sores/open wounds   Musculoskeletal: + Left foot pain    Endocrine: Negative for high or low blood sugars, heat or cold intolerance    Psych: Negative for manic behavior     Medications:  No current facility-administered medications on file prior to encounter. Current Outpatient Prescriptions on File Prior to Encounter   Medication Sig Dispense Refill    lovastatin (MEVACOR) 20 mg tablet TAKE 1 TABLET NIGHTLY FOR CHOLESTEROL 90 Tab 3    MULTIVITAMINS WITH FLUORIDE (MULTI-VITAMIN PO) Take  by mouth.  cyanocobalamin (VITAMIN B-12) 1,000 mcg tablet Take 1,000 mcg by mouth daily.  b complex vitamins tablet Take 1 Tab by mouth daily.       OTHER Histanex:  May take 5 ml by mouth up to three times per day as needed for cough 120 mL 0    alendronate (FOSAMAX) 70 mg tablet Take 70 mg by mouth every Sunday.  meloxicam (MOBIC) 15 mg tablet Take 7.5 mg by mouth daily.  cholecalciferol (VITAMIN D3) 1,000 unit tablet Take  by mouth daily.  vitamin c-vitamin e (CRANBERRY CONCENTRATE) cap Take  by mouth.                Physical Exam:    Vitals: Patient Vitals for the past 24 hrs:   Temp Pulse Resp BP SpO2   09/07/17 1506 97.4 °F (36.3 °C) 89 16 115/72 95 %   09/07/17 1158 98.3 °F (36.8 °C) 91 16 114/63 95 %   09/07/17 0856 98.2 °F (36.8 °C) 79 16 102/75 92 %   09/07/17 0358 99.6 °F (37.6 °C) 92 16 127/70 94 %   09/06/17 2314 - 95 16 118/68 95 %   09/06/17 1938 98.5 °F (36.9 °C) 94 16 109/59 97 %   09/06/17 1806 - 96 - 111/62 94 %   09/06/17 1735 - 94 16 119/67 97 %   09/06/17 1703 - 87 16 137/89 96 %   09/06/17 1629 97.4 °F (36.3 °C) 85 16 115/71 95 %   ·   · GEN: NAD  · HEENT: Normocephalic, atraumatic, PERRL, no scleral icterus,  no cervical, no lymphadenopathy, no sinus tenderness, no thrush  · CV: S1, S2 heard   · Lungs: Clear to auscultation bilaterally  · Abdomen: soft, non distended, non tender, no organomegaly appreciated, no CVA tenderness   · Genitourinary: no genital discharge, no muniz  · Extremities: no edema, + Left foot in dressing  · Neuro: Alert, oriented to time, place and situation, moves all extremities to commands, verbal   · Skin: no rash  · Psych: good affect, good eye contact, non tearful   · Lines:       Labs:   Recent Results (from the past 24 hour(s))   GLUCOSE, POC    Collection Time: 09/06/17  8:38 PM   Result Value Ref Range    Glucose (POC) 117 (H) 65 - 100 mg/dL    Performed by Kali Contreras ([facility])    METABOLIC PANEL, BASIC    Collection Time: 09/07/17  3:47 AM   Result Value Ref Range    Sodium 137 136 - 145 mmol/L    Potassium 4.1 3.5 - 5.1 mmol/L    Chloride 102 97 - 108 mmol/L    CO2 30 21 - 32 mmol/L    Anion gap 5 5 - 15 mmol/L    Glucose 92 65 - 100 mg/dL    BUN 12 6 - 20 MG/DL    Creatinine 0.79 0.55 - 1.02 MG/DL    BUN/Creatinine ratio 15 12 - 20      GFR est AA >60 >60 ml/min/1.73m2    GFR est non-AA >60 >60 ml/min/1.73m2    Calcium 8.3 (L) 8.5 - 10.1 MG/DL   CBC WITH AUTOMATED DIFF    Collection Time: 09/07/17  3:47 AM   Result Value Ref Range    WBC 6.6 3.6 - 11.0 K/uL    RBC 4.01 3.80 - 5.20 M/uL    HGB 12.2 11.5 - 16.0 g/dL    HCT 36.4 35.0 - 47.0 %    MCV 90.8 80.0 - 99.0 FL    MCH 30.4 26.0 - 34.0 PG    MCHC 33.5 30.0 - 36.5 g/dL    RDW 13.3 11.5 - 14.5 %    PLATELET 836 867 - 949 K/uL    NEUTROPHILS 60 32 - 75 %    LYMPHOCYTES 28 12 - 49 %    MONOCYTES 10 5 - 13 %    EOSINOPHILS 2 0 - 7 %    BASOPHILS 0 0 - 1 %    ABS. NEUTROPHILS 4.0 1.8 - 8.0 K/UL    ABS. LYMPHOCYTES 1.8 0.8 - 3.5 K/UL    ABS. MONOCYTES 0.7 0.0 - 1.0 K/UL    ABS. EOSINOPHILS 0.1 0.0 - 0.4 K/UL    ABS. BASOPHILS 0.0 0.0 - 0.1 K/UL   VANCOMYCIN, TROUGH    Collection Time: 09/07/17  3:47 AM   Result Value Ref Range    Vancomycin,trough 14.6 (H) 5.0 - 10.0 ug/mL    Reported dose date: 20170906      Reported dose time: 1600      Reported dose: 1000MG UNITS   GLUCOSE, POC    Collection Time: 09/07/17  7:58 AM   Result Value Ref Range    Glucose (POC) 110 (H) 65 - 100 mg/dL    Performed by Russell Pittman, POC    Collection Time: 09/07/17 11:31 AM   Result Value Ref Range    Glucose (POC) 118 (H) 65 - 100 mg/dL    Performed by Mireille Lima    GLUCOSE, POC    Collection Time: 09/07/17  3:22 PM   Result Value Ref Range    Glucose (POC) 125 (H) 65 - 100 mg/dL    Performed by Jesse Del Valle        Microbiology Data: Reviewed   G+C in pairs, probably S aureus coag negative 9/5/17 wound      Imaging:   Study Result      EXAM:  MRI FOOT LT W WO CONT     INDICATION: Foot pain. Evaluate for abscess.     COMPARISON: None.  No radiographs for comparison.     TECHNIQUE: Axial, coronal, and sagittal MRI of the left forefoot in the T1, T2,  and inversion-recovery pulse sequences with and without fat saturation .     CONTRAST: Pre and postcontrast imaging with 13 mL of MultiHance     FINDINGS: Bone marrow: There is a hallux valgus deformity. There is a dorsal  dislocation of the second MCP joint. There is dorsal subluxation of the third  MCP joint. There is edema in the distal half of the second metatarsal mildly  decreased T1 signal. There is associated enhancement.     Joint fluid: There is a mild second MCP joint effusion.     Tendons: The second extensor tendon is subluxed medially around the second  metatarsal head.     Muscles: There is diffuse mild edema in the musculature which may be reactive.     Neurovascular bundles: Within normal limits.     Articular cartilage: Intact without focal osteochondral lesion.     Soft tissues: No mass. There is a nonenhancing tract from the skin surface of  the volar forefoot to the second metatarsal head, best seen on series 8 images  15 through 13. This also seen on series 10 images 26 and 25. This has increased  T2 signal. Edema is seen over the dorsum of the foot.     IMPRESSION  IMPRESSION:   1. Fluid filled tract extending from the volar forefoot to the head of the  second metatarsal likely related to infection. 2. Abnormal signal in the distal half of the second metatarsal likely related to  osteomyelitis. 3. Mild second MTP joint effusion which could be reactive versus related to  septic arthritis. 4. Age-indeterminate dorsal dislocation of the second MCP joint with a claw toe  deformity. There is dorsal subluxation of the third MCP joint as well.             Assessment / Plan:     Karie Walters is a 60y.o. year old female with history of ? DM as patient says it is borderline and A1C was 5.6 in 2014 , meka/melvina  who was in her general state of health when she noticed pain in her left foot about a week or so ago. Denied any trauma to feet. She also reported that she had chills but denied fevers. Denied any open wounds on her foot or discharge.   Ortho notes indicate that she had \"  pared down the 2nd metatarsal head callus on the plantar surface of the foot.  After unroofing this, abundant purulent material came from the underlying ulceration.  This was cultured . \" She was admitted to the hospital on 9/5 and underwent amputation 2 toe, through MTP joint, and debridement of plantar foot abscess. Cultures are pending at this time but gram stain reported as g+C pairs and light probable S aureus, coag negative. MRI Left foot showed  \"fluid filled tract extending from the volar forefoot to the head of the second metatarsal likely related to infection\". \"Abnormal signal in the distal half of the second metatarsal likely related to osteomyelitis. : \"Mild second MTP joint effusion which could be reactive versus related to septic arthritis. . ID consulted for recommendations today. She denied being on any antibiotics prior to this hospitalization     1) Left foot plantar foot abscess, second osteomyelitis S/P amputation 2 toe, through MTP joint, and debridement of plantar foot abscess  Await cultures   Await surgical pathology of amputation  Based on findings will determine choice and  duration of abx   Please check blood cultures ( 2 sets), HBA1C, CBC wt diff, BMP, CRP, ESR   Continue Vancomycin per pharmacy dosing with close renal monitoring   Vancomycin trough goal 15-20  If clinical worsening, then will need to consider gram negative and anaerobic coverage but given she denied being on PO/IV abx prior to cultures being taken and gram stain only wt G+ organisms, ok to continue Vancomycin IV alone for now    2) Screening/Health maintenance: recommend HIV and Hep C screening if not done before       4) DVT Prophylaxis:  Per primary team     Thank for the opportunity to participate in the care of this patient. Please contact with questions or concerns.

## 2017-09-07 NOTE — INTERDISCIPLINARY ROUNDS
Bedside interdisciplinary rounds were held today to discuss patient plan of care and outcomes. The following members were present: Nurse Practitioner, Nurse, Clinical Care Leader, Pharmacy, Physical Therapy, and Case Management. Plan:  Awaiting culture results. Plan for ID consult. WBAT through heel. Blood cultures to be drawn today. Plan for home at discharge once antibiotics selected.

## 2017-09-07 NOTE — PROGRESS NOTES
physical Therapy EVALUATION/DISCHARGE  Patient: Robin Abraham (80 y.o. female)  Date: 2017  Primary Diagnosis: left foot abscess  ABSCESS OF FOOT  Foot abscess, left  Procedure(s) (LRB):  INCISION AND DEBRIDEMENT OF LEFT FOOT ABSCESS, AMPUTATION OF LEFT SECOND TOE, EXCISION OF LEFT SECOND METATARSAL HEAD  (GENERAL WITH BLOCK) (Left) 1 Day Post-Op   Precautions: Heel weight bearing only on L     ASSESSMENT :  Based on the objective data described below, the patient presents with independence with transfers and modified independence for ambulation using RW. Gait is steady and patient demonstrates good compliance with heel weight bearing only on L. Patient requires occasional VC for proper use of RW. Patient is safe on stairs using B hand rails. No further acute care PT indicated at this time. Recommend RW for ambulation  Will sign off     PLAN :  Discharge Recommendations: None  Further Equipment Recommendations for Discharge: rolling walker     SUBJECTIVE:   Patient stated I'm doing okay.     OBJECTIVE DATA SUMMARY:   HISTORY:    Past Medical History:   Diagnosis Date    Arthritis     Diabetes ([address]     GERD (gastroesophageal reflux disease)     Hyperlipidemia     Nausea & vomiting     Osteopenia      Past Surgical History:   Procedure Laterality Date    HX APPENDECTOMY      HX  SECTION      HX CHOLECYSTECTOMY       Prior Level of Function/Home Situation: patient reports complete independence with all mobility and ADLs; driving and working full time  [employer] [facility]    Home Situation  Home Environment: Private residence  # Steps to Enter: 3  Rails to Enter: Yes  Hand Rails : Bilateral  One/Two Story Residence: One story  Living Alone: No  Support Systems: Family member(s)  Patient Expects to be Discharged to[de-identified] Private residence  Current DME Used/Available at Home: None  Tub or Shower Type: Tub/Shower combination    EXAMINATION/PRESENTATION/DECISION MAKING:   Critical Behavior:  Neurologic State: Alert  Orientation Level: Oriented X4  Cognition: Appropriate decision making, Appropriate for age attention/concentration, Appropriate safety awareness     Hearing: Auditory  Auditory Impairment: None  Skin:  L foot dressing is dry and intact    Range Of Motion:  AROM: Within functional limits (except L ankle/foot in ace wrap limiting mobility)                       Strength:    Strength: Within functional limits                    Tone & Sensation:   Tone: Normal              Sensation: Impaired (reports numbness in L fott due to nerve block)               Coordination:  Coordination: Within functional limits  Vision:      Functional Mobility:  Bed Mobility:      deferred; upon arrival patient sitting in bedside chair        Transfers:  Sit to Stand: Modified independent  Stand to Sit: Modified independent                       Balance:   Sitting: Intact  Standing: Impaired  Standing - Static: Good; Unsupported  Standing - Dynamic : Good (using RW for support)  Ambulation/Gait Training:  Distance (ft): 150 Feet (ft)  Assistive Device: Walker, rolling  Ambulation - Level of Assistance: Modified independent        Gait Abnormalities: Step to gait; Antalgic     Left Side Weight Bearing: Heel  Base of Support: Widened (minimally)     Speed/Shaunna: Pace decreased (<100 feet/min)  Step Length: Right shortened      Gait is steady with no LOB noted        Stairs:  Number of Stairs Trained: 4  Stairs - Level of Assistance: Supervision   Rail Use: Both      Functional Measure:    Elder Mobility Scale    19/20         EMS and G-code impairment scale:  Percentage of impairment CH  0% CI  1-19% CJ  20-39% CK  40-59% CL  60-79% CM  80-99% CN  100%   EMS Score 0-20 20 17-19 13-16 9-12 5-8 1-4 0      Scores under 10  generally these patients are dependent in mobility maneuvers; require help with  basic ADL, such as transfers, toileting and dressing.     Scores between 10  13  generally these patients are borderline in terms of safe mobility and  independence in ADL i.e. they require some help with some mobility maneuvers. Scores over 14  Generally these patients are able to perform mobility maneuvers alone and safely  and are independent in basic ADL. G codes: In compliance with CMSs Claims Based Outcome Reporting, the following G-code set was chosen for this patient based on their primary functional limitation being treated: The outcome measure chosen to determine the severity of the functional limitation was the Eldery Mobility scale with a score of 19/20 which was correlated with the impairment scale. ? Mobility - Walking and Moving Around:     - CURRENT STATUS: CI - 1%-19% impaired, limited or restricted    - GOAL STATUS: CI - 1%-19% impaired, limited or restricted    - D/C STATUS:  CI - 1%-19% impaired, limited or restricted              Pain:Pain Scale 1: Numeric (0 - 10)  Pain Intensity 1: 3  Pain Location 1: Foot  Activity Tolerance:   VSS  Please refer to the flowsheet for vital signs taken during this treatment. After treatment:   [x]   Patient left in no apparent distress sitting up in chair  []   Patient left in no apparent distress in bed  [x]   Call bell left within reach  [x]   Nursing notified  []   Caregiver present  []   Bed alarm activated    COMMUNICATION/EDUCATION:   Communication/Collaboration:  [x]   Fall prevention education was provided and the patient/caregiver indicated understanding. [x]   Patient/family have participated as able and agree with findings and recommendations. []   Patient is unable to participate in plan of care at this time.   Findings and recommendations were discussed with: Physical Therapy Assistant, Registered Nurse and     Thank you for this referral.  Michele Huddleston, PT   Time Calculation: 36 mins

## 2017-09-07 NOTE — ROUTINE PROCESS
Bedside shift change report given to RUDY Ho (oncoming nurse) by Jors Perry RN (offgoing nurse). Report included the following information SBAR, Kardex, Intake/Output, MAR, Recent Results and Med Rec Status.

## 2017-09-07 NOTE — PROGRESS NOTES
S: doing well. Pain under control. No cp, sob, dizziness, calf pain    O:  Visit Vitals    /72 (BP 1 Location: Right arm, BP Patient Position: At rest)    Pulse 89    Temp 97.4 °F (36.3 °C)    Resp 16    Ht 5' 1\" (1.549 m)    Wt 65.8 kg (145 lb 1 oz)    SpO2 95%    BMI 27.41 kg/m2       L Lower Extremity:   - Dressing C/D/I  - Motor: +EHL/FHL/TA/GSC/PL/PB  - SILT DP/MP/LP/MP/SURAL/SAPHENOUS  - Vascular: + DP, CR < 2 sec      Pertinent Labs: Wound culture: GPC on gram stain    Blood cultures pending    Path pending  A/P: pod 1 Status post 2nd toe amputation, metatarsal head excision and abscess incision and debridement  -  Patient is doing well without any evidence of sepsis. - pain control  - DVT ppx - ambulate, SCD, lovenox daily while inpatient  - PT  - Heel WBAT in Postop shoe, crutches/walker for assist  - ID consulted and awaiting recommendations. - dispo: Will plan on discharge home when recommendations made by infectious disease.   No further surgical treatment planned

## 2017-09-07 NOTE — PROGRESS NOTES
Problem: Falls - Risk of  Goal: *Absence of Falls  Document Dwight Fall Risk and appropriate interventions in the flowsheet.    Outcome: Progressing Towards Goal  Fall Risk Interventions:  Mobility Interventions: Bed/chair exit alarm           Medication Interventions: Bed/chair exit alarm     Elimination Interventions: Call light in reach     History of Falls Interventions: Bed/chair exit alarm

## 2017-09-07 NOTE — PROGRESS NOTES
Pt 59 y/o female  admitted on 9/5/17 for incision and debridement of left foot abscess and amputation of left second toe. Pt was alert and oriented at the time of assessment. Demographic info reviewed. Pt lives with her brother in a ranch home has 3 steps at entrance. pt was independent with ADLs and IADLs. Pt has employment 8 hours a day with [employer]. Pt drives independently for her appointments. Pt uses [facility] at [place]. Pt never had experience with rehab/snf/or . As per PT reccomendation for [facility] RW CM send referral to [facility] through [facility]. [facility] delivered RW to pt room. CM will continuously assist pt with discharge planning. Care Management Interventions  PCP Verified by CM:  Yes  Mode of Transport at Discharge: Self (pt family will transport)  Transition of Care Consult (CM Consult): Discharge Planning  Discharge Durable Medical Equipment: Yes (rolling walker)  Health Maintenance Reviewed: Yes  Physical Therapy Consult: Yes  Occupational Therapy Consult: No  Speech Therapy Consult: No  Current Support Network: Lives with Caregiver (pt lives with her brother in a ranch home has 3 steps at entrance.)  Confirm Follow Up Transport: Family  Plan discussed with Pt/Family/Caregiver: Yes  Discharge Location  Discharge Placement:  (TBD)    Rosendo Hoffmann  Ext [phone]

## 2017-09-07 NOTE — PROGRESS NOTES
Ortho/ NeuroSurgery NP Note    POD# 1  s/p INCISION AND DEBRIDEMENT OF LEFT FOOT ABSCESS, AMPUTATION OF LEFT SECOND TOE, EXCISION OF LEFT SECOND METATARSAL HEAD  (GENERAL WITH BLOCK)   Pt seen with Dr. Yanique Magallanes    Pt resting in bed. No complaints. Denies pain. Reports she has been keeping up with pain med and has little side effects although she feels \"weak\". VSS Afebrile. Patient is voiding in adequate amounts. Labs  Lab Results   Component Value Date/Time    HGB 12.2 09/07/2017 03:47 AM        Body mass index is 27.41 kg/(m^2). BMI greater than 30 is classified as obesity and greater than 40 is classified as morbid obesity. Dressing c.d.i, ACE adjusted to remove gaps. Calves soft and supple; No pain with passive stretch  Sensation and motor intact  SCDs for mechanical DVT proph while in bed     PLAN:  1) PT BID  2) Lovenox for DVT Prophylaxis  3) Abx- Patient currently on Vancomycin. Dr. Agustina Llanos of infectious disease has been consulted and will be assisting us with antibiotic planning based on the cultures and pathology. Preliminary wound culture showing light probable staph, coagulase negative. 4) Plan d/c to home once abx plan is decided. Patient resides with her brother and nephew and is mobilizing with PT and nursing.     Laura Bunch NP

## 2017-09-07 NOTE — PROGRESS NOTES
Pharmacy Automatic Renal Dosing Protocol - Antimicrobials     Indication:  SSTI, Left foot wound,  S/p amputation 2 toe and debridement of plantar foot abscess     Current Regimens:      Vancomycin 1000mg IV q12h - started  day 2  Abx Allergies:  PCN, Sulfa     Microbiology Results (Current encounter)   Date/Time Order Name Specimen Source Lab Status   17 0908 CULTURE, BLOOD Blood --   17 1438 CULTURE, WOUND W GRAM STAIN Foot Preliminary result   17 1438 CULTURE, ANAEROBIC -- In process             Ht Readings from Last 1 Encounters:   17 154.9 cm (61\")     ,          Wt Readings from Last 1 Encounters:   17 65.8 kg (145 lb 1 oz)           Estimated Creatinine Clearance: 63.3 mL/min (based on Cr of 0.79). Estimated Creatinine Clearance (using IBW): 55.0 mL/min  Recent Labs      17   0347  17   0414  17   1225   CREA  0.79  0.62  0.62   BUN  12  14  13   WBC  6.6  4.8  5.5   NA  137  140  139   K  4.1  3.9  3.6   CA  8.3*  8.0*  8.1*   ALB   --    --   3.6   HGB  12.2  11.2*  13.4   HCT  36.4  34.3*  40.6   PLT  272  216  255   INR   --    --   1.1   ALT   --    --   25     Temp (24hrs), Av.4 °F (36.9 °C), Min:97.4 °F (36.3 °C), Max:99.6 °F (37.6 °C)       CAPD, PD, HD or CVVH:  none  Paralysis, amputations:  none  Vancomycin Trough Goal Level: 15-20 changed  until osteo r/o post amputation     Impression/Plan:  Afebrile, renal stable, and WCx light probable Staph species. Bone/tissue wound Cx pending to confirm any osteo involvement. PBCx ordered today. ID consulted. Ordered BMP and CBC daily x 2 more days. Vancomycin trough level  3:47 = 14.6 with actual trough calculated at 14.79 (15). SCr up very slightly this am.  Continue current Vancomycin regimen. Pharmacy will follow daily and adjust doses of monitored medications as appropriate.

## 2017-09-08 LAB
BACTERIA SPEC CULT: NORMAL
GLUCOSE BLD STRIP.AUTO-MCNC: 114 MG/DL (ref 65–100)
GLUCOSE BLD STRIP.AUTO-MCNC: 130 MG/DL (ref 65–100)
GLUCOSE BLD STRIP.AUTO-MCNC: 131 MG/DL (ref 65–100)
GLUCOSE BLD STRIP.AUTO-MCNC: 172 MG/DL (ref 65–100)
SERVICE CMNT-IMP: ABNORMAL
SERVICE CMNT-IMP: NORMAL

## 2017-09-08 PROCEDURE — 74011250636 HC RX REV CODE- 250/636: Performed by: NURSE PRACTITIONER

## 2017-09-08 PROCEDURE — 82962 GLUCOSE BLOOD TEST: CPT

## 2017-09-08 PROCEDURE — 74011250636 HC RX REV CODE- 250/636: Performed by: ORTHOPAEDIC SURGERY

## 2017-09-08 PROCEDURE — 74011250637 HC RX REV CODE- 250/637: Performed by: NURSE PRACTITIONER

## 2017-09-08 PROCEDURE — 65270000029 HC RM PRIVATE

## 2017-09-08 RX ORDER — FACIAL-BODY WIPES
10 EACH TOPICAL DAILY PRN
Status: DISCONTINUED | OUTPATIENT
Start: 2017-09-08 | End: 2017-09-12 | Stop reason: HOSPADM

## 2017-09-08 RX ORDER — POLYETHYLENE GLYCOL 3350 17 G/17G
17 POWDER, FOR SOLUTION ORAL DAILY
Status: DISCONTINUED | OUTPATIENT
Start: 2017-09-08 | End: 2017-09-12 | Stop reason: HOSPADM

## 2017-09-08 RX ADMIN — OXYCODONE HYDROCHLORIDE 5 MG: 5 TABLET ORAL at 20:06

## 2017-09-08 RX ADMIN — VANCOMYCIN HYDROCHLORIDE 1000 MG: 1 INJECTION, POWDER, LYOPHILIZED, FOR SOLUTION INTRAVENOUS at 16:24

## 2017-09-08 RX ADMIN — VANCOMYCIN HYDROCHLORIDE 1000 MG: 1 INJECTION, POWDER, LYOPHILIZED, FOR SOLUTION INTRAVENOUS at 03:14

## 2017-09-08 RX ADMIN — Medication 10 ML: at 08:31

## 2017-09-08 RX ADMIN — OXYCODONE HYDROCHLORIDE 10 MG: 5 TABLET ORAL at 03:11

## 2017-09-08 RX ADMIN — GABAPENTIN 100 MG: 100 CAPSULE ORAL at 08:28

## 2017-09-08 RX ADMIN — ACETAMINOPHEN 650 MG: 325 TABLET ORAL at 20:06

## 2017-09-08 RX ADMIN — ACETAMINOPHEN 650 MG: 325 TABLET ORAL at 08:27

## 2017-09-08 RX ADMIN — GABAPENTIN 100 MG: 100 CAPSULE ORAL at 16:25

## 2017-09-08 RX ADMIN — OXYCODONE HYDROCHLORIDE 5 MG: 5 TABLET ORAL at 16:25

## 2017-09-08 RX ADMIN — ACETAMINOPHEN 650 MG: 325 TABLET ORAL at 12:56

## 2017-09-08 RX ADMIN — OXYCODONE HYDROCHLORIDE 10 MG: 5 TABLET ORAL at 08:28

## 2017-09-08 RX ADMIN — Medication 10 ML: at 13:00

## 2017-09-08 RX ADMIN — PRAVASTATIN SODIUM 20 MG: 10 TABLET ORAL at 21:13

## 2017-09-08 RX ADMIN — GABAPENTIN 100 MG: 100 CAPSULE ORAL at 21:13

## 2017-09-08 RX ADMIN — Medication 10 ML: at 21:14

## 2017-09-08 RX ADMIN — SENNOSIDES AND DOCUSATE SODIUM 2 TABLET: 8.6; 5 TABLET ORAL at 08:27

## 2017-09-08 RX ADMIN — POLYETHYLENE GLYCOL 3350 17 G: 17 POWDER, FOR SOLUTION ORAL at 10:42

## 2017-09-08 RX ADMIN — ENOXAPARIN SODIUM 40 MG: 40 INJECTION SUBCUTANEOUS at 21:13

## 2017-09-08 RX ADMIN — ACETAMINOPHEN 650 MG: 325 TABLET ORAL at 03:11

## 2017-09-08 RX ADMIN — SENNOSIDES AND DOCUSATE SODIUM 2 TABLET: 8.6; 5 TABLET ORAL at 17:48

## 2017-09-08 NOTE — PROGRESS NOTES
S: No new complaints. Pain controlled. No cp, sob, dizziness, calf pain    O:  Visit Vitals    /75    Pulse 90    Temp 98.2 °F (36.8 °C)    Resp 18    Ht 5' 1\" (1.549 m)    Wt 65.8 kg (145 lb 1 oz)    SpO2 96%    BMI 27.41 kg/m2       L Lower Extremity:   - Dressing C/D/I  - Motor: +EHL/FHL/TA/GSC/PL/PB  - SILT DP/MP/LP/MP/SURAL/SAPHENOUS  - Vascular: + DP, CR < 2 sec  - no significant erythema or proximal streaking      Pertinent Labs: Wound culture: GPC on gram stain    Blood cultures: No growth at 17 hours    Path pending  A/P: pod 2 Status post 2nd toe amputation, metatarsal head excision and abscess incision and debridement  -  Patient is doing well without any evidence of sepsis. - pain control  - DVT ppx - ambulate, SCD, lovenox daily while inpatient  - PT  - Heel WBAT in Postop shoe, crutches/walker for assist  - ID consulted and awaiting recommendations. - dispo: Will plan on discharge home when recommendations made by infectious disease.   No further surgical treatment planned

## 2017-09-08 NOTE — ROUTINE PROCESS
Bedside and Verbal shift change report given to Mandy Maurer 43 (oncoming nurse) by Mandy Maurer 44 (offgoing nurse). Report included the following information SBAR, Kardex, Intake/Output, MAR and Recent Results.

## 2017-09-08 NOTE — PROGRESS NOTES
Problem: Falls - Risk of  Goal: *Absence of Falls  Document Dwight Fall Risk and appropriate interventions in the flowsheet.    Outcome: Progressing Towards Goal  Fall Risk Interventions:  Mobility Interventions: Patient to call before getting OOB, Utilize walker, cane, or other assitive device           Medication Interventions: Patient to call before getting OOB, Teach patient to arise slowly     Elimination Interventions: Call light in reach, Patient to call for help with toileting needs     History of Falls Interventions: Utilize gait belt for transfer/ambulation

## 2017-09-08 NOTE — PROGRESS NOTES
Ms Lr [place] doing well overall. Tolerating abx. Admits to pain in L foot. 10 point ROS done and positives as above, all others negative. Visit Vitals    /70 (BP Patient Position: At rest)    Pulse 88    Temp 97.8 °F (36.6 °C)    Resp 18    Ht 5' 1\" (1.549 m)    Wt 145 lb 1 oz (65.8 kg)    SpO2 94%    BMI 27.41 kg/m2     Gen NAD  HEENT : NC AT no scleral icterus, no thrush   CV S 1 2 regular   Lungs CTA B/L  ABD soft NT ND  LE LLE no edema, RLE in dressing   Skin no rash   Psych: non tearful, good affect  Neuro: alert oriented verbal follows commands     Labs, micro, pathology, imaging reviewed     Microbiology Data: Reviewed   G+C in pairs, probably S aureus coag negative 9/5/17 wound        Imaging:       Study Result       EXAM:  MRI FOOT LT W WO CONT      INDICATION: Foot pain. Evaluate for abscess.      COMPARISON: None. No radiographs for comparison.      TECHNIQUE: Axial, coronal, and sagittal MRI of the left forefoot in the T1, T2,  and inversion-recovery pulse sequences with and without fat saturation .      CONTRAST: Pre and postcontrast imaging with 13 mL of MultiHance      FINDINGS: Bone marrow: There is a hallux valgus deformity. There is a dorsal  dislocation of the second MCP joint. There is dorsal subluxation of the third  MCP joint. There is edema in the distal half of the second metatarsal mildly  decreased T1 signal. There is associated enhancement.      Joint fluid: Casimiro Fee is a mild second MCP joint effusion.      Tendons: The second extensor tendon is subluxed medially around the second  metatarsal head.      Muscles: There is diffuse mild edema in the musculature which may be reactive.      Neurovascular bundles: Within normal limits.      Articular cartilage: Intact without focal osteochondral lesion.      Soft tissues: No mass.  There is a nonenhancing tract from the skin surface of  the volar forefoot to the second metatarsal head, best seen on series 8 images  15 through 13. This also seen on series 10 images 26 and 25. This has increased  T2 signal. Edema is seen over the dorsum of the foot.      IMPRESSION  IMPRESSION:   1. Fluid filled tract extending from the volar forefoot to the head of the  second metatarsal likely related to infection. 2. Abnormal signal in the distal half of the second metatarsal likely related to  osteomyelitis. 3. Mild second MTP joint effusion which could be reactive versus related to  septic arthritis. 4. Age-indeterminate dorsal dislocation of the second MCP joint with a claw toe  deformity. There is dorsal subluxation of the third MCP joint as well.                Assessment / Plan:      Jade Naidu is a 60y.o. year old female with history of ? DM as patient says it is borderline and A1C was 5.6 in 2014 , meka/melvina  who was in her general state of health when she noticed pain in her left foot about a week or so ago. Denied any trauma to feet. She also reported that she had chills but denied fevers. Denied any open wounds on her foot or discharge. Ortho notes indicate that she had \"  pared down the 2nd metatarsal head callus on the plantar surface of the foot.  After unroofing this, abundant purulent material came from the underlying ulceration.  This was cultured . \" She was admitted to the hospital on 9/5 and underwent amputation 2 toe, through MTP joint, and debridement of plantar foot abscess. Cultures are pending at this time but gram stain reported as g+C pairs and light probable S aureus, coag negative. MRI Left foot showed  \"fluid filled tract extending from the volar forefoot to the head of the second metatarsal likely related to infection\". \"Abnormal signal in the distal half of the second metatarsal likely related to osteomyelitis. : \"Mild second MTP joint effusion which could be reactive versus related to septic arthritis. . ID consulted for recommendations today.  She denied being on any antibiotics prior to this hospitalization      1) Left foot plantar foot abscess, second osteomyelitis S/P amputation 2 toe, through MTP joint, and debridement of plantar foot abscess      Discussed with surgeon today. From discussion, there was an abscess about 1 cm from the 2nd MT that was sent for culture. Given the MRI findings of sinus tract, I am concerned for  osteomyelitis proximal to the 2nd toe where the ulcer was. Without bone cultures or bone margins, cannot be certain regarding duration of antibiotics. Operative report indicates distal portion of MT resected and plantar ulcer debrided. Do not see that bone cultures sent from this. Will need bone biopsy and culture to guide treatment recommendations regarding osteomyelitis      If the entire sinus tract and entire metatarsal including head  was resected during surgery, then will not recommend further antibiotics. If the entire sinus tract /metatarsal and head were not excised, then would recommend IV abx (such as 6 weeks )    Staph aureus has a good correlation between soft tissue and bone cultures. The only isolated organism is coag neg staph which again , I am not sure if it was a skin contaminant on the abscess culture     Would continue current  IV abx while awaiting pathology for bone culture, margins if done  and abscess culture results. Keep Vancomycin trough goal 15-20 wt close renal monitoring       2) Screening/Health maintenance: recommend HIV and Hep C screening if not done before         4) DVT Prophylaxis:  Per primary team      Thank for the opportunity to participate in the care of this patient. Please contact with questions or concerns. Dr Edward Curtis is on call this weekend and can be reached by calling [facility] .  I will return 9/11/17.        .

## 2017-09-09 LAB
GLUCOSE BLD STRIP.AUTO-MCNC: 107 MG/DL (ref 65–100)
GLUCOSE BLD STRIP.AUTO-MCNC: 131 MG/DL (ref 65–100)
GLUCOSE BLD STRIP.AUTO-MCNC: 145 MG/DL (ref 65–100)
GLUCOSE BLD STRIP.AUTO-MCNC: 147 MG/DL (ref 65–100)
SERVICE CMNT-IMP: ABNORMAL

## 2017-09-09 PROCEDURE — 74011250636 HC RX REV CODE- 250/636: Performed by: NURSE PRACTITIONER

## 2017-09-09 PROCEDURE — 82962 GLUCOSE BLOOD TEST: CPT

## 2017-09-09 PROCEDURE — 74011250637 HC RX REV CODE- 250/637: Performed by: NURSE PRACTITIONER

## 2017-09-09 PROCEDURE — 65270000029 HC RM PRIVATE

## 2017-09-09 PROCEDURE — 74011250636 HC RX REV CODE- 250/636: Performed by: ORTHOPAEDIC SURGERY

## 2017-09-09 PROCEDURE — 74011636637 HC RX REV CODE- 636/637: Performed by: NURSE PRACTITIONER

## 2017-09-09 RX ADMIN — ACETAMINOPHEN 650 MG: 325 TABLET ORAL at 12:24

## 2017-09-09 RX ADMIN — GABAPENTIN 100 MG: 100 CAPSULE ORAL at 22:14

## 2017-09-09 RX ADMIN — Medication 10 ML: at 22:12

## 2017-09-09 RX ADMIN — ACETAMINOPHEN 650 MG: 325 TABLET ORAL at 17:07

## 2017-09-09 RX ADMIN — INSULIN LISPRO 2 UNITS: 100 INJECTION, SOLUTION INTRAVENOUS; SUBCUTANEOUS at 12:27

## 2017-09-09 RX ADMIN — POLYETHYLENE GLYCOL 3350 17 G: 17 POWDER, FOR SOLUTION ORAL at 09:26

## 2017-09-09 RX ADMIN — Medication 10 ML: at 14:19

## 2017-09-09 RX ADMIN — PRAVASTATIN SODIUM 20 MG: 10 TABLET ORAL at 22:12

## 2017-09-09 RX ADMIN — ACETAMINOPHEN 650 MG: 325 TABLET ORAL at 04:33

## 2017-09-09 RX ADMIN — VANCOMYCIN HYDROCHLORIDE 1000 MG: 1 INJECTION, POWDER, LYOPHILIZED, FOR SOLUTION INTRAVENOUS at 16:11

## 2017-09-09 RX ADMIN — SENNOSIDES AND DOCUSATE SODIUM 2 TABLET: 8.6; 5 TABLET ORAL at 17:07

## 2017-09-09 RX ADMIN — Medication 10 ML: at 04:34

## 2017-09-09 RX ADMIN — ENOXAPARIN SODIUM 40 MG: 40 INJECTION SUBCUTANEOUS at 22:12

## 2017-09-09 RX ADMIN — INSULIN LISPRO 2 UNITS: 100 INJECTION, SOLUTION INTRAVENOUS; SUBCUTANEOUS at 17:07

## 2017-09-09 RX ADMIN — OXYCODONE HYDROCHLORIDE 10 MG: 5 TABLET ORAL at 12:24

## 2017-09-09 RX ADMIN — OXYCODONE HYDROCHLORIDE 10 MG: 5 TABLET ORAL at 16:18

## 2017-09-09 RX ADMIN — VANCOMYCIN HYDROCHLORIDE 1000 MG: 1 INJECTION, POWDER, LYOPHILIZED, FOR SOLUTION INTRAVENOUS at 04:32

## 2017-09-09 RX ADMIN — GABAPENTIN 100 MG: 100 CAPSULE ORAL at 16:10

## 2017-09-09 RX ADMIN — OXYCODONE HYDROCHLORIDE 5 MG: 5 TABLET ORAL at 02:49

## 2017-09-09 RX ADMIN — OXYCODONE HYDROCHLORIDE 5 MG: 5 TABLET ORAL at 22:12

## 2017-09-09 RX ADMIN — OXYCODONE HYDROCHLORIDE 5 MG: 5 TABLET ORAL at 07:54

## 2017-09-09 RX ADMIN — SENNOSIDES AND DOCUSATE SODIUM 2 TABLET: 8.6; 5 TABLET ORAL at 09:26

## 2017-09-09 RX ADMIN — GABAPENTIN 100 MG: 100 CAPSULE ORAL at 09:26

## 2017-09-09 RX ADMIN — ACETAMINOPHEN 650 MG: 325 TABLET ORAL at 22:12

## 2017-09-09 NOTE — PROGRESS NOTES
S: No new complaints. Mild burning pain in left foot this morning, but pain controlled. No cp, sob, dizziness, calf pain    O:  Visit Vitals    /88    Pulse 80    Temp 97.5 °F (36.4 °C)    Resp 18    Ht 5' 1\" (1.549 m)    Wt 65.8 kg (145 lb 1 oz)    SpO2 98%    BMI 27.41 kg/m2       L Lower Extremity:   - Dressing removed. Incision c/d/i with nylon suture in place. No erythema; no drainage; minimal soft tissue swelling at forefoot  - Motor: +EHL/FHL/TA/GSC/PL/PB  - SILT DP/MP/LP/MP/SURAL/SAPHENOUS  - Vascular: + DP, CR < 2 sec      Pertinent Labs: Wound culture: GPC on gram stain, pending    Blood cultures: No growth at 2 days    Path:    FINAL PATHOLOGIC DIAGNOSIS   Left 2nd toe, amputation:   Cutaneous ulcer with prominent granulation tissue with acute osteomyelitis of underlying phalangeal bone. Soft tissue and bony margin appear viable but are closely approached by acute inflammation. A/P: pod 3 Status post 2nd toe amputation, metatarsal head excision and abscess incision and debridement  -  Patient is doing well without any evidence of sepsis. - pain control  - DVT ppx - ambulate, SCD, lovenox daily while inpatient  - PT  - Heel WBAT in Postop shoe, crutches/walker for assist  - ID consulted and awaiting recommendations. - dispo: Will plan on discharge home when recommendations made by infectious disease.   No further surgical treatment planned

## 2017-09-10 LAB
ANION GAP SERPL CALC-SCNC: 6 MMOL/L (ref 5–15)
BUN SERPL-MCNC: 11 MG/DL (ref 6–20)
BUN/CREAT SERPL: 17 (ref 12–20)
CALCIUM SERPL-MCNC: 8.8 MG/DL (ref 8.5–10.1)
CHLORIDE SERPL-SCNC: 105 MMOL/L (ref 97–108)
CO2 SERPL-SCNC: 28 MMOL/L (ref 21–32)
CREAT SERPL-MCNC: 0.66 MG/DL (ref 0.55–1.02)
GLUCOSE BLD STRIP.AUTO-MCNC: 112 MG/DL (ref 65–100)
GLUCOSE BLD STRIP.AUTO-MCNC: 133 MG/DL (ref 65–100)
GLUCOSE BLD STRIP.AUTO-MCNC: 135 MG/DL (ref 65–100)
GLUCOSE BLD STRIP.AUTO-MCNC: 157 MG/DL (ref 65–100)
GLUCOSE SERPL-MCNC: 104 MG/DL (ref 65–100)
POTASSIUM SERPL-SCNC: 4.2 MMOL/L (ref 3.5–5.1)
SERVICE CMNT-IMP: ABNORMAL
SODIUM SERPL-SCNC: 139 MMOL/L (ref 136–145)

## 2017-09-10 PROCEDURE — 74011250637 HC RX REV CODE- 250/637: Performed by: NURSE PRACTITIONER

## 2017-09-10 PROCEDURE — 82962 GLUCOSE BLOOD TEST: CPT

## 2017-09-10 PROCEDURE — 74011636637 HC RX REV CODE- 636/637: Performed by: NURSE PRACTITIONER

## 2017-09-10 PROCEDURE — 74011250636 HC RX REV CODE- 250/636: Performed by: ORTHOPAEDIC SURGERY

## 2017-09-10 PROCEDURE — 80048 BASIC METABOLIC PNL TOTAL CA: CPT | Performed by: INTERNAL MEDICINE

## 2017-09-10 PROCEDURE — 65270000029 HC RM PRIVATE

## 2017-09-10 PROCEDURE — 36415 COLL VENOUS BLD VENIPUNCTURE: CPT | Performed by: INTERNAL MEDICINE

## 2017-09-10 PROCEDURE — 74011250636 HC RX REV CODE- 250/636: Performed by: NURSE PRACTITIONER

## 2017-09-10 RX ADMIN — GABAPENTIN 100 MG: 100 CAPSULE ORAL at 15:35

## 2017-09-10 RX ADMIN — GABAPENTIN 100 MG: 100 CAPSULE ORAL at 09:02

## 2017-09-10 RX ADMIN — PRAVASTATIN SODIUM 20 MG: 10 TABLET ORAL at 21:45

## 2017-09-10 RX ADMIN — SENNOSIDES AND DOCUSATE SODIUM 2 TABLET: 8.6; 5 TABLET ORAL at 09:02

## 2017-09-10 RX ADMIN — OXYCODONE HYDROCHLORIDE 5 MG: 5 TABLET ORAL at 09:03

## 2017-09-10 RX ADMIN — Medication 10 ML: at 07:54

## 2017-09-10 RX ADMIN — Medication 10 ML: at 21:47

## 2017-09-10 RX ADMIN — OXYCODONE HYDROCHLORIDE 10 MG: 5 TABLET ORAL at 21:52

## 2017-09-10 RX ADMIN — ACETAMINOPHEN 650 MG: 325 TABLET ORAL at 04:55

## 2017-09-10 RX ADMIN — GABAPENTIN 100 MG: 100 CAPSULE ORAL at 21:45

## 2017-09-10 RX ADMIN — ACETAMINOPHEN 650 MG: 325 TABLET ORAL at 11:07

## 2017-09-10 RX ADMIN — INSULIN LISPRO 2 UNITS: 100 INJECTION, SOLUTION INTRAVENOUS; SUBCUTANEOUS at 17:32

## 2017-09-10 RX ADMIN — ENOXAPARIN SODIUM 40 MG: 40 INJECTION SUBCUTANEOUS at 21:46

## 2017-09-10 RX ADMIN — VANCOMYCIN HYDROCHLORIDE 1000 MG: 1 INJECTION, POWDER, LYOPHILIZED, FOR SOLUTION INTRAVENOUS at 15:35

## 2017-09-10 RX ADMIN — SENNOSIDES AND DOCUSATE SODIUM 2 TABLET: 8.6; 5 TABLET ORAL at 17:32

## 2017-09-10 RX ADMIN — Medication 10 ML: at 13:52

## 2017-09-10 RX ADMIN — ACETAMINOPHEN 650 MG: 325 TABLET ORAL at 22:22

## 2017-09-10 RX ADMIN — VANCOMYCIN HYDROCHLORIDE 1000 MG: 1 INJECTION, POWDER, LYOPHILIZED, FOR SOLUTION INTRAVENOUS at 03:49

## 2017-09-10 RX ADMIN — POLYETHYLENE GLYCOL 3350 17 G: 17 POWDER, FOR SOLUTION ORAL at 09:03

## 2017-09-10 RX ADMIN — ACETAMINOPHEN 650 MG: 325 TABLET ORAL at 17:32

## 2017-09-10 NOTE — PROGRESS NOTES
Bedside shift change report given to Frankie (oncoming nurse) by Aditya Clark (offgoing nurse). Report included the following information SBAR, Procedure Summary, Intake/Output, MAR, Accordion and Recent Results.

## 2017-09-10 NOTE — PROGRESS NOTES
[facility] - Progress Note  Post Op day: 4 Days 229 [facility]     [id number]  female    60 y.o.    1955    Admit date:  2017  Date of Surgery:  2017   Procedures:  Procedure(s):  INCISION AND DEBRIDEMENT OF LEFT FOOT ABSCESS, AMPUTATION OF LEFT SECOND TOE, EXCISION OF LEFT SECOND METATARSAL HEAD  (GENERAL WITH BLOCK)  Admitting Physician:  Quang Caballero MD   Surgeon:  Kathi Pacheco) and Role:     * Neftali Caballero MD - Primary    Consulting Physician(s): Treatment Team: Attending Provider: Quang Caballero MD; Utilization Review: Eric Tamayo; Consulting Provider: Mj Sheikh DO; Care Manager: Simeon Haas  SUBJECTIVE:     Navarro Olmos is a 60 y.o. female s/p Procedure(s):  INCISION AND DEBRIDEMENT OF LEFT FOOT ABSCESS, AMPUTATION OF LEFT SECOND TOE, EXCISION OF LEFT SECOND METATARSAL HEAD  (GENERAL WITH BLOCK) resting in the chair. Pt notes intermittent pain- tolerating PO pain meds well. States she is getting around well with walker and has done well with PT. Denies fever/chills, nausea, vomiting, dizziness, lightheadedness, chest pain, or shortness of breath. OBJECTIVE:       Physical Exam:  General: alert, cooperative, no distress. Cardiovascular: brisk cap refill all digits. Genitourinary: Voiding independently   Respiratory: negative  Neurological:Neurovascular exam within normal limits. Senstion intact: to light touch throughout the LLE   Motor: + DF/PF/EHL. Musculoskeletal: Lesia's sign negative in bilateral lower extremities. Calves soft, supple, non-tender upon palpation or with passive stretch. Skin: NWD  Dressing/Wound:  Clean, dry and intact. No significant erythema or swelling.     Vital Signs:       Patient Vitals for the past 8 hrs:   BP Temp Pulse Resp SpO2   09/10/17 1213 112/72 98.2 °F (36.8 °C) 92 16 96 %   09/10/17 0753 110/70 98.2 °F (36.8 °C) 89 16 96 %                                          Temp (24hrs), Av °F (36.7 °C), Min:97.7 °F (36.5 °C), Max:98.3 °F (36.8 °C)      Date 09/10/17 0700 - 09/11/17 0659   Shift 2465-1224 3839-4521 0490-2230 24 Hour Total   I  N  T  A  K  E   P.O. 480   480    Shift Total  (mL/kg) 480  (7.3)   480  (7.3)   O  U  T  P  U  T   Shift Total  (mL/kg)       Weight (kg) 65.8 65.8 65.8 65.8     Labs:      No results for input(s): HCT, HGB, INR, HCTEXT, HGBEXT in the last 72 hours. No lab exists for component: INREXT  PT/OT:        Gait  Base of Support: Widened (minimally)  Speed/Shaunna: Pace decreased (<100 feet/min)  Step Length: Right shortened  Gait Abnormalities: Step to gait, Antalgic  Ambulation - Level of Assistance: Modified independent  Distance (ft): 150 Feet (ft)  Assistive Device: Walker, rolling  Rail Use: Both  Stairs - Level of Assistance: Supervision  Number of Stairs Trained: 4     ASSESSMENT / PLAN:   Principal Problem: Foot abscess, left (9/5/2017)    pt doing well    - ID consulted and awaiting recommendations. .. Minor Ronde Cultures pending!!!    - Heel WBAT in Postop shoe, crutches/walker for assist  - DC home when recommendations made by infectious disease.   No further surgical treatment planned    Signed By: Bonita Bassett PA-C

## 2017-09-11 LAB
ANION GAP SERPL CALC-SCNC: 8 MMOL/L (ref 5–15)
BACTERIA SPEC CULT: ABNORMAL
BACTERIA SPEC CULT: ABNORMAL
BUN SERPL-MCNC: 11 MG/DL (ref 6–20)
BUN/CREAT SERPL: 15 (ref 12–20)
CALCIUM SERPL-MCNC: 8.6 MG/DL (ref 8.5–10.1)
CHLORIDE SERPL-SCNC: 106 MMOL/L (ref 97–108)
CO2 SERPL-SCNC: 26 MMOL/L (ref 21–32)
CREAT SERPL-MCNC: 0.72 MG/DL (ref 0.55–1.02)
GLUCOSE BLD STRIP.AUTO-MCNC: 106 MG/DL (ref 65–100)
GLUCOSE BLD STRIP.AUTO-MCNC: 109 MG/DL (ref 65–100)
GLUCOSE BLD STRIP.AUTO-MCNC: 114 MG/DL (ref 65–100)
GLUCOSE BLD STRIP.AUTO-MCNC: 152 MG/DL (ref 65–100)
GLUCOSE SERPL-MCNC: 93 MG/DL (ref 65–100)
GRAM STN SPEC: ABNORMAL
GRAM STN SPEC: ABNORMAL
POTASSIUM SERPL-SCNC: 4.1 MMOL/L (ref 3.5–5.1)
SERVICE CMNT-IMP: ABNORMAL
SODIUM SERPL-SCNC: 140 MMOL/L (ref 136–145)

## 2017-09-11 PROCEDURE — 80048 BASIC METABOLIC PNL TOTAL CA: CPT | Performed by: INTERNAL MEDICINE

## 2017-09-11 PROCEDURE — 36415 COLL VENOUS BLD VENIPUNCTURE: CPT | Performed by: INTERNAL MEDICINE

## 2017-09-11 PROCEDURE — 74011250637 HC RX REV CODE- 250/637: Performed by: NURSE PRACTITIONER

## 2017-09-11 PROCEDURE — 74011250636 HC RX REV CODE- 250/636: Performed by: NURSE PRACTITIONER

## 2017-09-11 PROCEDURE — 65270000029 HC RM PRIVATE

## 2017-09-11 PROCEDURE — 74011250636 HC RX REV CODE- 250/636: Performed by: ORTHOPAEDIC SURGERY

## 2017-09-11 PROCEDURE — 82962 GLUCOSE BLOOD TEST: CPT

## 2017-09-11 RX ORDER — CEFAZOLIN SODIUM 1 G/3ML
2 INJECTION, POWDER, FOR SOLUTION INTRAMUSCULAR; INTRAVENOUS EVERY 8 HOURS
Status: DISCONTINUED | OUTPATIENT
Start: 2017-09-11 | End: 2017-09-11

## 2017-09-11 RX ORDER — CEFAZOLIN SODIUM IN 0.9 % NACL 2 G/100 ML
2 PLASTIC BAG, INJECTION (ML) INTRAVENOUS EVERY 8 HOURS
Status: DISCONTINUED | OUTPATIENT
Start: 2017-09-11 | End: 2017-09-12

## 2017-09-11 RX ADMIN — VANCOMYCIN HYDROCHLORIDE 1000 MG: 1 INJECTION, POWDER, LYOPHILIZED, FOR SOLUTION INTRAVENOUS at 04:37

## 2017-09-11 RX ADMIN — GABAPENTIN 100 MG: 100 CAPSULE ORAL at 21:37

## 2017-09-11 RX ADMIN — ACETAMINOPHEN 650 MG: 325 TABLET ORAL at 04:37

## 2017-09-11 RX ADMIN — OXYCODONE HYDROCHLORIDE 10 MG: 5 TABLET ORAL at 21:44

## 2017-09-11 RX ADMIN — Medication 10 ML: at 04:48

## 2017-09-11 RX ADMIN — PRAVASTATIN SODIUM 20 MG: 10 TABLET ORAL at 21:37

## 2017-09-11 RX ADMIN — GABAPENTIN 100 MG: 100 CAPSULE ORAL at 17:00

## 2017-09-11 RX ADMIN — OXYCODONE HYDROCHLORIDE 10 MG: 5 TABLET ORAL at 04:40

## 2017-09-11 RX ADMIN — ACETAMINOPHEN 650 MG: 325 TABLET ORAL at 17:00

## 2017-09-11 RX ADMIN — Medication 10 ML: at 17:01

## 2017-09-11 RX ADMIN — ENOXAPARIN SODIUM 40 MG: 40 INJECTION SUBCUTANEOUS at 21:37

## 2017-09-11 RX ADMIN — Medication 10 ML: at 21:37

## 2017-09-11 RX ADMIN — POLYETHYLENE GLYCOL 3350 17 G: 17 POWDER, FOR SOLUTION ORAL at 10:11

## 2017-09-11 RX ADMIN — CEFAZOLIN 2 G: 10 INJECTION, POWDER, FOR SOLUTION INTRAVENOUS; PARENTERAL at 17:14

## 2017-09-11 RX ADMIN — SENNOSIDES AND DOCUSATE SODIUM 2 TABLET: 8.6; 5 TABLET ORAL at 10:12

## 2017-09-11 RX ADMIN — OXYCODONE HYDROCHLORIDE 5 MG: 5 TABLET ORAL at 17:10

## 2017-09-11 RX ADMIN — GABAPENTIN 100 MG: 100 CAPSULE ORAL at 10:12

## 2017-09-11 RX ADMIN — OXYCODONE HYDROCHLORIDE 5 MG: 5 TABLET ORAL at 10:11

## 2017-09-11 RX ADMIN — ACETAMINOPHEN 650 MG: 325 TABLET ORAL at 22:22

## 2017-09-11 RX ADMIN — SENNOSIDES AND DOCUSATE SODIUM 2 TABLET: 8.6; 5 TABLET ORAL at 17:10

## 2017-09-11 RX ADMIN — ACETAMINOPHEN 650 MG: 325 TABLET ORAL at 10:11

## 2017-09-11 NOTE — PROGRESS NOTES
Nutrition Services      Nutrition Screen:  Wt Readings from Last 10 Encounters:   09/05/17 65.8 kg (145 lb 1 oz)   02/10/16 65.5 kg (144 lb 6.4 oz)   10/02/15 63.8 kg (140 lb 9.6 oz)   07/06/15 63 kg (139 lb)   11/24/14 62.6 kg (138 lb)   03/31/14 60.8 kg (134 lb)   11/13/13 61.2 kg (135 lb)   05/20/13 61.2 kg (135 lb)   04/15/13 61.2 kg (135 lb)   01/21/13 63.5 kg (140 lb)     Body mass index is 27.41 kg/(m^2). Discussed Diabetic ,Consistent Carbohydrate diet w/ pt  who expressed knowledge & understanding about diet /dz relationship & as it relates to  healing. Provided contact & written info/questions after dc. Supplements:                        _____ ordered ______  declined. __ __  Pt is nutritionally stable at this time, will rescreen in 7 days. _x __    Pt is at nutritional risk and will be rescreened in 3-6 days. __ __  Pt is at moderate or high nutritional risk, will refer to RD for assessment.        Joe Peer  Dietetic Technician, Registered

## 2017-09-11 NOTE — PROGRESS NOTES
Ortho / Neurosurgery NP Note    POD# 1  s/p INCISION AND DEBRIDEMENT OF LEFT FOOT ABSCESS, AMPUTATION OF LEFT SECOND TOE, EXCISION OF LEFT SECOND METATARSAL HEAD  (GENERAL WITH BLOCK)   Pt seen with Dr. Kash Diaz    Pt resting in bed. No complaints. Pain with palpation only    VSS Afebrile. Voiding status: + void    Labs  Lab Results   Component Value Date/Time    HGB 12.2 09/07/2017 03:47 AM      Lab Results   Component Value Date/Time    INR 1.1 09/05/2017 12:25 PM        Body mass index is 27.41 kg/(m^2). : A BMI > 30 is classified as obesity and > 40 is classified as morbid obesity. Foot splint in place. Incision healing well no signs of infection, no drainage noted. Dressing c.d.i  Plantar ulcer over 2 tarsal head with pink / red tissue at base. No active drainage. Appears dry. No surrounding erythema, edema. Calves soft and supple; No pain with passive stretch  Sensation and motor intact    PLAN:  1) PT BID, WBAT on heel  2) Lovenox for DVT Prophylaxis   3) Second toe Infection - cultures with no growth to date. Discussed with Dr. Kash Diaz.  She is needing pathology report to determine if bone margins are negative for infection. Dr. Gaetano Llanos stated that he sent 2nd phalanx and metatarsal head to pathology. Only one specimen in path lab labeled 2nd toe - unclear if this includes metatarsal head or not - Dr. Kash Diaz awaiting return phone kate from Dr. Randa Burns. If bone margins can be determined to be negative, then no further abx is needed. If + then osteo persists and continued course of IV abx would be warranted. Pt has been on IV vanco since 9/5. Will continue for now and await final path report. Pt educated and aware of plan  4) Plan d/c home based on abx need.     Jana Farah NP

## 2017-09-11 NOTE — PROGRESS NOTES
Pt seen and examined this AM.  Doing well.    L foot in bandage, c/d/i    Plan:  - D/c home if cleared by ID  - F/U w/ Dr Grant Walton next week

## 2017-09-11 NOTE — PROGRESS NOTES
Subjective:   Ms Jodee Schrader seen today. Says doing well, tolerating abx, denied rash, diarrhea, chills, fever. Has some pain in post op site toe. 10 point ROS done and positives as above, all others negative. Visit Vitals    /76 (BP 1 Location: Right arm, BP Patient Position: At rest)    Pulse 83    Temp 97.8 °F (36.6 °C)    Resp 18    Ht 5' 1\" (1.549 m)    Wt 145 lb 1 oz (65.8 kg)    SpO2 97%    BMI 27.41 kg/m2     Gen NAD  HEENT : NC AT no scleral icterus, no thrush   CV S 1 2 regular   Lungs CTA B/L  ABD soft NT ND  LE LLE no edema, RLE , 2nd toe site of amputation appears clean without any discharge , open wounds or erythema. On the plantar side is ulcer that is tender, no gross purulence or erythema around site   Skin no rash   Psych: non tearful, good affect  Neuro: alert oriented verbal follows commands       Labs, micro, pathology, imaging reviewed     Microbiology Data:     9/6 foot abscess   Component Value Flag Ref Range Units Status      Special Requests: NO SPECIAL REQUESTS     Final     GRAM STAIN RARE WBCS SEEN     Final     GRAM STAIN      Final     RARE GRAM POSITIVE COCCI IN PAIRS     Culture result:  (A)    Final     LIGHT STAPHYLOCOCCUS LUGDUNENSIS     Culture result:  (A)    Final     FEW STAPHYLOCOCCUS SPECIES, COAGULASE NEGATIVE (POSSIBLE 2 DIFFERENT COLONY TYPES/STRAINS)       Culture & Susceptibility      STAPHYLOCOCCUS LUGDUNENSIS      Antibiotic Sensitivity LEONEL Unit Status     Ampicillin ($) Resistant >8 ug/mL Final     Method: LEONEL     Ampicillin/sulbactam ($) Susceptible <=8/4 ug/mL Final     Method: LEONEL     Cefazolin ($) Susceptible <=4 ug/mL Final     Method: LEONEL     Ciprofloxacin ($) Susceptible <=1 ug/mL Final     Method: LEONEL     Clindamycin ($) Resistant <=0.5 ug/mL Final     Comment:  This Staph species is presumed resistant based on detection of inducible clindamycin resistance     Method: LEONEL     Daptomycin ($$$$$) Susceptible <=0.5 ug/mL Final     Method: LEONEL     Erythromycin ($$$$) Resistant >4 ug/mL Final     Method: LEONEL     Gentamicin ($) Susceptible <=4 ug/mL Final     Method: LEONEL     Levofloxacin ($) Susceptible <=1 ug/mL Final     Method: LEONEL     Linezolid ($$$$$) Susceptible 2 ug/mL Final     Method: LEONEL     Oxacillin Susceptible 0.5 ug/mL Final     Method: LEONEL     Penicillin G ($$) Resistant >8 ug/mL Final     Method: LEONEL     Rifampin ($$$$) Susceptible <=1 ug/mL Final     Comment: Rifampin is not to be used for mono-therapy.     Method: LEONEL     Tetracycline Susceptible <=4 ug/mL Final     Method: LEONEL     Trimeth-Sulfamethoxa Susceptible <=0.5/9.5 ug/mL Final     Method: LEONEL     Vancomycin ($) Susceptible 2 ug/mL Final     Method: LEONEL                       Imaging:       Study Result       EXAM:  MRI FOOT LT W WO CONT      INDICATION: Foot pain. Evaluate for abscess.      COMPARISON: None. No radiographs for comparison.      TECHNIQUE: Axial, coronal, and sagittal MRI of the left forefoot in the T1, T2,  and inversion-recovery pulse sequences with and without fat saturation .      CONTRAST: Pre and postcontrast imaging with 13 mL of MultiHance      FINDINGS: Bone marrow: There is a hallux valgus deformity. There is a dorsal  dislocation of the second MCP joint. There is dorsal subluxation of the third  MCP joint. There is edema in the distal half of the second metatarsal mildly  decreased T1 signal. There is associated enhancement.      Joint fluid: Ruiz John is a mild second MCP joint effusion.      Tendons: The second extensor tendon is subluxed medially around the second  metatarsal head.      Muscles: There is diffuse mild edema in the musculature which may be reactive.      Neurovascular bundles: Within normal limits.      Articular cartilage: Intact without focal osteochondral lesion.      Soft tissues: No mass.  There is a nonenhancing tract from the skin surface of  the volar forefoot to the second metatarsal head, best seen on series 8 images  15 through 13. This also seen on series 10 images 26 and 25. This has increased  T2 signal. Edema is seen over the dorsum of the foot.      IMPRESSION  IMPRESSION:   1. Fluid filled tract extending from the volar forefoot to the head of the  second metatarsal likely related to infection. 2. Abnormal signal in the distal half of the second metatarsal likely related to  osteomyelitis. 3. Mild second MTP joint effusion which could be reactive versus related to  septic arthritis. 4. Age-indeterminate dorsal dislocation of the second MCP joint with a claw toe  deformity. There is dorsal subluxation of the third MCP joint as well.                Assessment / Plan:      Minerva Ly is a 60y.o. year old female with history of ? DM as patient says it is borderline and A1C was 5.6 in 2014 , meka/melvina  who was in her general state of health when she noticed pain in her left foot about a week or so ago. Denied any trauma to feet. She also reported that she had chills but denied fevers. Denied any open wounds on her foot or discharge. Ortho notes indicate that she had \"  pared down the 2nd metatarsal head callus on the plantar surface of the foot.  After unroofing this, abundant purulent material came from the underlying ulceration.  This was cultured . \" She was admitted to the hospital on 9/5 and underwent amputation 2 toe, through MTP joint, and debridement of plantar foot abscess. Cultures are pending at this time but gram stain reported as g+C pairs and light probable S aureus, coag negative. MRI Left foot showed  \"fluid filled tract extending from the volar forefoot to the head of the second metatarsal likely related to infection\". \"Abnormal signal in the distal half of the second metatarsal likely related to osteomyelitis. : \"Mild second MTP joint effusion which could be reactive versus related to septic arthritis. . ID consulted for recommendations today.  She denied being on any antibiotics prior to this hospitalization      1) Left foot plantar foot abscess, second osteomyelitis S/P amputation 2 toe, through MTP joint, and debridement of plantar foot abscess    Pathology reported as 2nd toe, I called and discussed with pathologist today to ensure that MT head margins were looked at for margins. Also Discussed wt ortho, Ms [facility]. Pathology results to be resulted tomorrow per discussion wt team to give final antibiotic recommendations    Also discussed wt micro lab. Multiple organisms that they are trying to isolate from abscess cx. So far no S aureus see and likely Skin vicki but at times S .ludgenensis is a pathogen    Can DC vancomycin as no isolation of MRSA and would suspect it to grow if present. D/W her and she reports that she has taken Amoxicillin with no problems. She felt she had a PCN related rash when she was a child. After that has taken Amoxicillin in her adult life without any issues. Therefore , would stop Vancomycin and place on Cefazolin IV. If pathology tomorrow is negative for MT head margins, then would recommend no further antibiotics on discharge. Above plan was discussed with her in depth. Also counseled her to let us know if any rash, diarrhea, issues wt abx       2) DVT Prophylaxis:  Per primary team      Thank for the opportunity to participate in the care of this patient.  Please contact with questions or concerns.     .

## 2017-09-12 VITALS
BODY MASS INDEX: 27.39 KG/M2 | RESPIRATION RATE: 16 BRPM | HEART RATE: 86 BPM | TEMPERATURE: 98 F | SYSTOLIC BLOOD PRESSURE: 134 MMHG | WEIGHT: 145.06 LBS | DIASTOLIC BLOOD PRESSURE: 71 MMHG | OXYGEN SATURATION: 97 % | HEIGHT: 61 IN

## 2017-09-12 LAB
ANION GAP SERPL CALC-SCNC: 9 MMOL/L (ref 5–15)
BUN SERPL-MCNC: 11 MG/DL (ref 6–20)
BUN/CREAT SERPL: 15 (ref 12–20)
CALCIUM SERPL-MCNC: 8.6 MG/DL (ref 8.5–10.1)
CHLORIDE SERPL-SCNC: 105 MMOL/L (ref 97–108)
CO2 SERPL-SCNC: 26 MMOL/L (ref 21–32)
CREAT SERPL-MCNC: 0.71 MG/DL (ref 0.55–1.02)
GLUCOSE BLD STRIP.AUTO-MCNC: 106 MG/DL (ref 65–100)
GLUCOSE BLD STRIP.AUTO-MCNC: 133 MG/DL (ref 65–100)
GLUCOSE SERPL-MCNC: 95 MG/DL (ref 65–100)
POTASSIUM SERPL-SCNC: 4 MMOL/L (ref 3.5–5.1)
SERVICE CMNT-IMP: ABNORMAL
SERVICE CMNT-IMP: ABNORMAL
SODIUM SERPL-SCNC: 140 MMOL/L (ref 136–145)

## 2017-09-12 PROCEDURE — 74011250637 HC RX REV CODE- 250/637: Performed by: NURSE PRACTITIONER

## 2017-09-12 PROCEDURE — 80048 BASIC METABOLIC PNL TOTAL CA: CPT | Performed by: INTERNAL MEDICINE

## 2017-09-12 PROCEDURE — 74011250636 HC RX REV CODE- 250/636: Performed by: ORTHOPAEDIC SURGERY

## 2017-09-12 PROCEDURE — 82962 GLUCOSE BLOOD TEST: CPT

## 2017-09-12 PROCEDURE — 36415 COLL VENOUS BLD VENIPUNCTURE: CPT | Performed by: INTERNAL MEDICINE

## 2017-09-12 RX ORDER — POLYETHYLENE GLYCOL 3350 17 G/17G
17 POWDER, FOR SOLUTION ORAL
Qty: 15 PACKET | Refills: 0 | Status: SHIPPED | OUTPATIENT
Start: 2017-09-12 | End: 2017-09-27

## 2017-09-12 RX ORDER — AMOXICILLIN 250 MG
1 CAPSULE ORAL DAILY
Qty: 30 TAB | Refills: 0 | Status: SHIPPED | OUTPATIENT
Start: 2017-09-12

## 2017-09-12 RX ORDER — OXYCODONE HYDROCHLORIDE 5 MG/1
5 TABLET ORAL
Qty: 40 TAB | Refills: 0 | Status: SHIPPED | OUTPATIENT
Start: 2017-09-12

## 2017-09-12 RX ORDER — ACETAMINOPHEN 325 MG/1
650 TABLET ORAL
Status: SHIPPED | COMMUNITY
Start: 2017-09-12

## 2017-09-12 RX ADMIN — Medication 10 ML: at 06:27

## 2017-09-12 RX ADMIN — ACETAMINOPHEN 650 MG: 325 TABLET ORAL at 10:10

## 2017-09-12 RX ADMIN — POLYETHYLENE GLYCOL 3350 17 G: 17 POWDER, FOR SOLUTION ORAL at 10:10

## 2017-09-12 RX ADMIN — GABAPENTIN 100 MG: 100 CAPSULE ORAL at 10:10

## 2017-09-12 RX ADMIN — ACETAMINOPHEN 650 MG: 325 TABLET ORAL at 06:26

## 2017-09-12 RX ADMIN — CEFAZOLIN 2 G: 10 INJECTION, POWDER, FOR SOLUTION INTRAVENOUS; PARENTERAL at 10:08

## 2017-09-12 RX ADMIN — CEFAZOLIN 2 G: 10 INJECTION, POWDER, FOR SOLUTION INTRAVENOUS; PARENTERAL at 00:40

## 2017-09-12 RX ADMIN — SENNOSIDES AND DOCUSATE SODIUM 2 TABLET: 8.6; 5 TABLET ORAL at 10:10

## 2017-09-12 NOTE — PROGRESS NOTES
Reviewed discharge instructions, prescriptions, and side effects with patient and her brother. Reviewed wound care, follow-up instructions, and medication instructions. Answered all questions and provided contact information for future questions. Took IV out per policy, Catheter tip intact. Going home in a car with family.

## 2017-09-12 NOTE — DISCHARGE SUMMARY
Ortho Discharge Summary    Patient ID:  Garth Noel  [id number]  female  60 y.o.  1955    Admit date: 9/5/2017    Discharge date: 9/12/2017    Admitting Physician: Ezell Ingle. Tania Cowden, MD     Consulting Physician(s):   Treatment Team: Attending Provider: Ezell Ingle. Tania Cowden, MD; Utilization Review: Leda Dye; Consulting Provider: Elaina Gabriel DO; Care Manager: Bhargav Helms    Date of Surgery:   9/6/2017     Preoperative Diagnosis:  ABSCESS OF LEFT FOOT    Postoperative Diagnosis:   ABSCESS OF LEFT FOOT, HAMMERTOE, PLANTAR KERATOSIS          Procedure(s):     INCISION AND DEBRIDEMENT OF LEFT FOOT ABSCESS, AMPUTATION OF LEFT SECOND TOE, EXCISION OF LEFT SECOND METATARSAL HEAD  (GENERAL WITH BLOCK)     Anesthesia Type:   General     Surgeon: Ezell Ingle. Tania Cowden, MD                            HPI:  Pt is a 60 y.o. female who has a history of ABSCESS OF LEFT FOOT  with pain and limitations of activities of daily living who presents at this time for a left 2nd toe amputation following the failure of conservative management. PMH:   Past Medical History:   Diagnosis Date    Arthritis     Diabetes ([address]     GERD (gastroesophageal reflux disease)     Hyperlipidemia     Nausea & vomiting     Osteopenia        Body mass index is 27.41 kg/(m^2). : A BMI > 30 is classified as obesity and > 40 is classified as morbid obesity. Medications upon admission :   Prior to Admission Medications   Prescriptions Last Dose Informant Patient Reported? Taking? MULTIVITAMINS WITH FLUORIDE (MULTI-VITAMIN PO) 9/4/2017 at Unknown time  Yes Yes   Sig: Take  by mouth. OTHER Unknown at Unknown time  No No   Sig: Histanex:  May take 5 ml by mouth up to three times per day as needed for cough   acetaminophen (TYLENOL) 325 mg tablet   Yes Yes   Sig: Take 2 Tabs by mouth every six (6) hours as needed for Pain.  For mild to moderate pain   alendronate (FOSAMAX) 70 mg tablet Not Taking at Unknown time  Yes No Sig: Take 70 mg by mouth every Sunday. b complex vitamins tablet 9/4/2017 at Unknown time  Yes Yes   Sig: Take 1 Tab by mouth daily. cholecalciferol (VITAMIN D3) 1,000 unit tablet Not Taking at Unknown time  Yes No   Sig: Take  by mouth daily. cyanocobalamin (VITAMIN B-12) 1,000 mcg tablet 9/4/2017 at Unknown time  Yes Yes   Sig: Take 1,000 mcg by mouth daily. cyclobenzaprine (FLEXERIL) 5 mg tablet 9/3/2017  Yes Yes   Sig: Take 7.5 mg by mouth three (3) times daily as needed for Muscle Spasm(s). gabapentin (NEURONTIN) 100 mg capsule 9/6/2017 at Unknown time  Yes Yes   Sig: Take  by mouth three (3) times daily. lovastatin (MEVACOR) 20 mg tablet 9/4/2017 at Unknown time  No Yes   Sig: TAKE 1 TABLET NIGHTLY FOR CHOLESTEROL   meloxicam (MOBIC) 15 mg tablet Unknown at Unknown time  Yes No   Sig: Take 7.5 mg by mouth daily. vitamin c-vitamin e (CRANBERRY CONCENTRATE) cap Unknown at Unknown time  Yes No   Sig: Take  by mouth. Facility-Administered Medications: None        Allergies: Allergies   Allergen Reactions    Penicillin G Rash     Has been able to take amoxicillin in past     Sulfa (Sulfonamide Antibiotics) Rash        Hospital Course: The patient underwent surgery. Complications:  None; patient tolerated the procedure well. Was taken to the PACU in stable condition and then transferred to the ortho floor. Perioperative Antibiotics:  vancomycin ; switched to ancef on final day which she tolerated well    Postoperative Pain Management:  Oxycodone      DVT Prophylaxis: lovenox     Postoperative transfusions:    Number of units banked PRBCs =   none     Post Op complications: none    Hemoglobin at discharge:    Lab Results   Component Value Date/Time    HGB 12.2 09/07/2017 03:47 AM    INR 1.1 09/05/2017 12:25 PM       Dressing was changed on POD # 5. Incision - clean, dry and intact. No significant erythema or swelling. Neurovascular exam found to be within normal limits.  Wound appears to be healing without any evidence of infection. Metatarsal head margins clean with no evidence of osteo. Dr. Meredith Marrero, ID, consulted and recommended discontinuation of Abx since path report shows no inflammation at margins of resected metatarsal head. Physical Therapy started on the day following surgery and participated in bed mobility, transfers and ambulation. Heel touch weight bearing only     Gait:  Gait  Base of Support: Widened (minimally)  Speed/Shaunna: Pace decreased (<100 feet/min)  Step Length: Right shortened  Gait Abnormalities: Step to gait, Antalgic  Ambulation - Level of Assistance: Modified independent  Distance (ft): 150 Feet (ft)  Assistive Device: Walker, rolling  Rail Use: Both  Stairs - Level of Assistance: Supervision  Number of Stairs Trained: 4                   Discharged to: Home. Condition on Discharge:   stable    Discharge instructions:   - Take pain medications as prescribed  - Resume pre hospital diet      - Discharge activity: activity as tolerated  - Ambulate with Walker;    - Weight bearing status - heel touch WB  - Wound Care Keep wound clean and dry. See discharge instruction sheet. - ambulate for DVT ppx at home          -[place]  1560 LIST     Current Discharge Medication List      START taking these medications    Details   oxyCODONE IR (ROXICODONE) 5 mg immediate release tablet Take 1 Tab by mouth every four (4) hours as needed (For pain 7-10). Max Daily Amount: 30 mg.  Qty: 40 Tab, Refills: 0      acetaminophen (TYLENOL) 325 mg tablet Take 2 Tabs by mouth every six (6) hours as needed for Pain. For mild to moderate pain      polyethylene glycol (MIRALAX) 17 gram packet Take 1 Packet by mouth daily as needed (constipation) for up to 15 days. Qty: 15 Packet, Refills: 0      senna-docusate (PERICOLACE) 8.6-50 mg per tablet Take 1 Tab by mouth daily.   Qty: 30 Tab, Refills: 0         CONTINUE these medications which have NOT CHANGED    Details gabapentin (NEURONTIN) 100 mg capsule Take  by mouth three (3) times daily. cyclobenzaprine (FLEXERIL) 5 mg tablet Take 7.5 mg by mouth three (3) times daily as needed for Muscle Spasm(s). lovastatin (MEVACOR) 20 mg tablet TAKE 1 TABLET NIGHTLY FOR CHOLESTEROL  Qty: 90 Tab, Refills: 3      MULTIVITAMINS WITH FLUORIDE (MULTI-VITAMIN PO) Take  by mouth.      cyanocobalamin (VITAMIN B-12) 1,000 mcg tablet Take 1,000 mcg by mouth daily. b complex vitamins tablet Take 1 Tab by mouth daily. OTHER Histanex:  May take 5 ml by mouth up to three times per day as needed for cough  Qty: 120 mL, Refills: 0    Associated Diagnoses: Cough      alendronate (FOSAMAX) 70 mg tablet Take 70 mg by mouth every Sunday. cholecalciferol (VITAMIN D3) 1,000 unit tablet Take  by mouth daily. vitamin c-vitamin e (CRANBERRY CONCENTRATE) cap Take  by mouth. STOP taking these medications       meloxicam (MOBIC) 15 mg tablet Comments:   Reason for Stopping:            per medical continuation form      -Follow up in office in 7 days with Dr. Yanique Magallanes  F/u Dr. Agustina Llanos as needed    Signed:  Nehemiah Leyva.  Abena Steven, MSN, ACNP, ONP-C  Orthopaedic Nurse Practitioner    9/12/2017  2:07 PM

## 2017-09-12 NOTE — PROGRESS NOTES
Bedside shift change report given to Augusto RN (oncoming nurse) by estelita RN (offgoing nurse). Report included the following information SBAR, Kardex, OR Summary, Intake/Output, MAR and Recent Results.

## 2017-09-12 NOTE — PROGRESS NOTES
Subjective:   Ms Sharyn Villalobos seen today earlier in the day. D/W her and Orthopedic team.  She tolerated Cefazolin well. Denied any discharge from post op site to her knowledge. Site is painful. Denied diarrhea while on abx, fever, chills. 10 point ROS done and positives as above, all others negative. Visit Vitals    /71    Pulse 86    Temp 98 °F (36.7 °C)    Resp 16    Ht 5' 1\" (1.549 m)    Wt 145 lb 1 oz (65.8 kg)    SpO2 97%    BMI 27.41 kg/m2     Gen NAD  HEENT : NC AT no scleral icterus, no thrush   CV S 1 2 regular   Lungs CTA B/L  ABD soft NT ND  LE LLE no edema, RLE , 2nd toe site of amputation appears clean without any discharge or erythema over it. Sutures noted,  On the plantar side is a small  ulcer that has no surrounding erythema or discharge from at this time   Skin no rash   Psych: non tearful, good affect  Neuro: alert oriented verbal follows commands       Labs, micro, pathology, imaging reviewed     Microbiology Data:     9/6 foot abscess   Component Value Flag Ref Range Units Status      Special Requests: NO SPECIAL REQUESTS     Final     GRAM STAIN RARE WBCS SEEN     Final     GRAM STAIN      Final     RARE GRAM POSITIVE COCCI IN PAIRS     Culture result:  (A)    Final     LIGHT STAPHYLOCOCCUS LUGDUNENSIS     Culture result:  (A)    Final     FEW STAPHYLOCOCCUS SPECIES, COAGULASE NEGATIVE (POSSIBLE 2 DIFFERENT COLONY TYPES/STRAINS)       Culture & Susceptibility      STAPHYLOCOCCUS LUGDUNENSIS      Antibiotic Sensitivity LEONEL Unit Status     Ampicillin ($) Resistant >8 ug/mL Final     Method: LEONEL     Ampicillin/sulbactam ($) Susceptible <=8/4 ug/mL Final     Method: LEONEL     Cefazolin ($) Susceptible <=4 ug/mL Final     Method: LEONEL     Ciprofloxacin ($) Susceptible <=1 ug/mL Final     Method: LEONEL     Clindamycin ($) Resistant <=0.5 ug/mL Final     Comment:  This Staph species is presumed resistant based on detection of inducible clindamycin resistance     Method: LEONEL     Daptomycin ($$$$$) Susceptible <=0.5 ug/mL Final     Method: LEONEL     Erythromycin ($$$$) Resistant >4 ug/mL Final     Method: LEONEL     Gentamicin ($) Susceptible <=4 ug/mL Final     Method: LEONEL     Levofloxacin ($) Susceptible <=1 ug/mL Final     Method: LEONEL     Linezolid ($$$$$) Susceptible 2 ug/mL Final     Method: LEONEL     Oxacillin Susceptible 0.5 ug/mL Final     Method: LEONEL     Penicillin G ($$) Resistant >8 ug/mL Final     Method: LEONEL     Rifampin ($$$$) Susceptible <=1 ug/mL Final     Comment: Rifampin is not to be used for mono-therapy.     Method: LEONEL     Tetracycline Susceptible <=4 ug/mL Final     Method: LEONEL     Trimeth-Sulfamethoxa Susceptible <=0.5/9.5 ug/mL Final     Method: LEONEL     Vancomycin ($) Susceptible 2 ug/mL Final     Method: LEONEL                       Imaging:       Study Result       EXAM:  MRI FOOT LT W WO CONT      INDICATION: Foot pain. Evaluate for abscess.      COMPARISON: None. No radiographs for comparison.      TECHNIQUE: Axial, coronal, and sagittal MRI of the left forefoot in the T1, T2,  and inversion-recovery pulse sequences with and without fat saturation .      CONTRAST: Pre and postcontrast imaging with 13 mL of MultiHance      FINDINGS: Bone marrow: There is a hallux valgus deformity. There is a dorsal  dislocation of the second MCP joint. There is dorsal subluxation of the third  MCP joint. There is edema in the distal half of the second metatarsal mildly  decreased T1 signal. There is associated enhancement.      Joint fluid: [place] is a mild second MCP joint effusion.      Tendons: The second extensor tendon is subluxed medially around the second  metatarsal head.      Muscles: There is diffuse mild edema in the musculature which may be reactive.      Neurovascular bundles: Within normal limits.      Articular cartilage: Intact without focal osteochondral lesion.      Soft tissues: No mass.  There is a nonenhancing tract from the skin surface of  the volar forefoot to the second metatarsal head, best seen on series 8 images  15 through 13. This also seen on series 10 images 26 and 25. This has increased  T2 signal. Edema is seen over the dorsum of the foot.      IMPRESSION  IMPRESSION:   1. Fluid filled tract extending from the volar forefoot to the head of the  second metatarsal likely related to infection. 2. Abnormal signal in the distal half of the second metatarsal likely related to  osteomyelitis. 3. Mild second MTP joint effusion which could be reactive versus related to  septic arthritis. 4. Age-indeterminate dorsal dislocation of the second MCP joint with a claw toe  deformity. There is dorsal subluxation of the third MCP joint as well.              Pathology:       Addendum Diagnosis   Left 2nd toe, amputation:   Tissue from presumed metatarsal margin: Trabecular bone and articular cartilage with acute synovitis. No findings diagnostic of acute osteomyelitis, see comment   Addendum Comment   After additional review of the specimen, within the container, there is a separate, 1.0 cm shave of convex bone approximately 0.3 cm thick surfaced by tan smooth presumed articular cartilage. The specimen is inked and a representative sampling submitted, following decalcification, in 1C. Addendum Electronically Signed Out By Carmen Cook M.D. Left 2nd toe, amputation:   Cutaneous ulcer with prominent granulation tissue with acute osteomyelitis of underlying phalangeal bone. Soft tissue and bony margin appear viable but are closely approached by acute inflammation. Assessment / Plan:      Lorie Herzog is a 60y.o. year old female with history of ? DM as patient says it is borderline and A1C was 5.6 in 2014 , meka/melvina  who was in her general state of health when she noticed pain in her left foot about a week or so ago. Denied any trauma to feet. She also reported that she had chills but denied fevers. Denied any open wounds on her foot or discharge. Ortho notes indicate that she had \"  pared down the 2nd metatarsal head callus on the plantar surface of the foot.  After unroofing this, abundant purulent material came from the underlying ulceration.  This was cultured . \" She was admitted to the hospital on 9/5 and underwent amputation 2 toe, through MTP joint, and debridement of plantar foot abscess. Cultures are pending at this time but gram stain reported as g+C pairs and light probable S aureus, coag negative. MRI Left foot showed  \"fluid filled tract extending from the volar forefoot to the head of the second metatarsal likely related to infection\". \"Abnormal signal in the distal half of the second metatarsal likely related to osteomyelitis. : \"Mild second MTP joint effusion which could be reactive versus related to septic arthritis. . ID consulted for recommendations today. She denied being on any antibiotics prior to this hospitalization      1) Left foot plantar foot abscess, second osteomyelitis S/P amputation 2 toe, through MTP joint, and debridement of plantar foot abscess  Abscess cx wt mixed coag neg staph and S.lugdunensis   Per discussion with orthopedics, sinus tract and MT head removed   D/W orthopedic team and surgeon, pathologist multiple times this admission and MT head  bone margins requested with results of no acute osteomyelitis on pathology  I discussed with her that there is notes of \"synovitis\" on pathology report but no osteomyelitis  Her wound appears clean at this time and given negative margins , discussed with patient and given she has already had IV abx since 9/5/17, recommend no further antibiotics on discharge  If she has any issues going forward, will be happy to see her in clinic if needed     2) Reported PCN allergy. However, she has tolerated amoxicillin in past and also had cefazolin this admission and tolerated that well.      3) DVT Prophylaxis:  Per primary team      Thank for the opportunity to participate in the care of this patient. Please contact with questions or concerns.  ID will sign off. Aliyah Mckeon

## 2017-09-12 NOTE — PROGRESS NOTES
Ortho / Neurosurgery NP Note    POD#6  s/p INCISION AND DEBRIDEMENT OF LEFT FOOT ABSCESS, AMPUTATION OF LEFT SECOND TOE, EXCISION OF LEFT SECOND METATARSAL HEAD  (GENERAL WITH BLOCK)   Pt seen with Dr. Kiera Velazquez; discussed with Dr. David Viera    Pt resting in bed. No complaints. Pain with palpation only    VSS Afebrile. Voiding status: + void  Tolerating PO well  Tolerated Ancef last night    Labs  Lab Results   Component Value Date/Time    HGB 12.2 09/07/2017 03:47 AM      Lab Results   Component Value Date/Time    INR 1.1 09/05/2017 12:25 PM        Body mass index is 27.41 kg/(m^2). : A BMI > 30 is classified as obesity and > 40 is classified as morbid obesity. Metatarsal head margins are negative for osteo    Foot splint in place. Incision healing well, no signs of infection, no drainage noted. Dressing c.d.i  Plantar ulcer over 2 tarsal head with pink / red tissue at base. No active drainage. Appears dry. No surrounding erythema, edema. Calves soft and supple; No pain with passive stretch  Sensation and motor intact    PLAN:  1) PT BID, WBAT on heel  2) Lovenox for DVT Prophylaxis   3) Second toe Infection - cultures with no growth to date. No further Abx needed and may f/u with Dr. Kiera Velazquez only as needed. Change dressings daily with dry dressing  4) Plan d/c home today. F/u with Dr. David Viera next week in office.     Aster Zavala NP

## 2017-09-12 NOTE — DISCHARGE INSTRUCTIONS
James Ng MD  Postoperative Instructions    Right/Left Lower Extremity:    Heel touch weightbearing                Dressing:   Keep Dressing or Splint clean & dry   Change dressing daily    Showering:   You may shower 48 hours after your surgery. Do Not allow dressing or splint to get wet! Diet:    Advance diet as tolerated. You may experience nausea due to anesthesia, pain or pain medications. You should avoid spicy or heavy meals initially. Pain Management:   If you have received a block, the pain relief can last from 4-24 hours. This also means you may not have sensation or movement in your foot for the same amount of time.  You will have pain after the block wears off. Anticipate this and start pain medications prior to the block wearing off.  Do Not drive while taking narcotic pain medications. Elevation:   Strict elevation at or just above the level of your heart for the first 14 days after surgery. Limit time that foot is in dependent position for trips to bathroom and kitchen as much as possible. Early control of swelling will improve future swelling. Ice:     You may ice your surgical extremity for no longer than 20 minutes at a time, 3-4 times per day. Do Not apply ice directly to the skin. _     Please DO NOT take any NSAIDs (Ibuprofen, Advil, Motrin, or Aleve)    Call our office at [phone] if the following occur:   Severe swelling, profuse bleeding or severe pain which does not improve with pain medication.  Fever greater than 101.0; fevers less than this are very common in the first few days after surgery and are unlikely to indicate infection. Follow up: next week with Dr. Laura Shea.   Call [phone] to schedule an appointment

## 2017-09-13 LAB
BACTERIA SPEC CULT: NORMAL
BACTERIA SPEC CULT: NORMAL
SERVICE CMNT-IMP: NORMAL
SERVICE CMNT-IMP: NORMAL

## 2022-03-19 PROBLEM — L02.612 FOOT ABSCESS, LEFT: Status: ACTIVE | Noted: 2017-09-05

## 2024-08-29 ENCOUNTER — TRANSCRIBE ORDERS (OUTPATIENT)
Facility: HOSPITAL | Age: 69
End: 2024-08-29

## 2024-08-29 DIAGNOSIS — R22.32 NODULE OF SKIN OF LEFT UPPER EXTREMITY: Primary | ICD-10-CM

## 2025-05-14 ENCOUNTER — TRANSCRIBE ORDERS (OUTPATIENT)
Facility: HOSPITAL | Age: 70
End: 2025-05-14

## 2025-05-14 DIAGNOSIS — M51.369 BULGING LUMBAR DISC: ICD-10-CM

## 2025-05-14 DIAGNOSIS — M54.16 LUMBAR RADICULOPATHY, RIGHT: Primary | ICD-10-CM

## (undated) DEVICE — TOWEL SURG W17XL27IN STD BLU COT NONFENESTRATED PREWASHED

## (undated) DEVICE — PRECISION (9.0 X 0.51 X 31.0MM)

## (undated) DEVICE — OCCLUSIVE GAUZE STRIP,3% BISMUTH TRIBROMOPHENATE IN PETROLATUM BLEND: Brand: XEROFORM

## (undated) DEVICE — STERILE POLYISOPRENE POWDER-FREE SURGICAL GLOVES: Brand: PROTEXIS

## (undated) DEVICE — X-RAY SPONGES,16 PLY: Brand: DERMACEA

## (undated) DEVICE — Z CONVERTED USE 2107985 COVER FLROSCP W36XL28IN 4 SIDE ADH

## (undated) DEVICE — REM POLYHESIVE ADULT PATIENT RETURN ELECTRODE: Brand: VALLEYLAB

## (undated) DEVICE — DRAPE,U/ SHT,SPLIT,PLAS,STERIL: Brand: MEDLINE

## (undated) DEVICE — LIGHT HANDLE: Brand: DEVON

## (undated) DEVICE — GAUZE SPONGES,12 PLY: Brand: CURITY

## (undated) DEVICE — PENCIL ES L3M BTTN SWCH S STL HEX LOK BLDE ELECTRD HOLSTER

## (undated) DEVICE — EXTREMITY III-LF: Brand: MEDLINE INDUSTRIES, INC.

## (undated) DEVICE — SUTURE ETHLN SZ 4-0 L18IN NONABSORBABLE BLK L19MM PS-2 3/8 1667H

## (undated) DEVICE — INFECTION CONTROL KIT SYS

## (undated) DEVICE — STERILE POLYISOPRENE POWDER-FREE SURGICAL GLOVES WITH EMOLLIENT COATING: Brand: PROTEXIS